# Patient Record
Sex: FEMALE | Race: WHITE | Employment: FULL TIME | URBAN - METROPOLITAN AREA
[De-identification: names, ages, dates, MRNs, and addresses within clinical notes are randomized per-mention and may not be internally consistent; named-entity substitution may affect disease eponyms.]

---

## 2019-02-27 ENCOUNTER — OFFICE VISIT (OUTPATIENT)
Dept: FAMILY MEDICINE CLINIC | Age: 49
End: 2019-02-27
Payer: COMMERCIAL

## 2019-02-27 VITALS
HEIGHT: 61 IN | OXYGEN SATURATION: 99 % | BODY MASS INDEX: 28.32 KG/M2 | WEIGHT: 150 LBS | TEMPERATURE: 97 F | HEART RATE: 82 BPM | RESPIRATION RATE: 16 BRPM | SYSTOLIC BLOOD PRESSURE: 118 MMHG | DIASTOLIC BLOOD PRESSURE: 78 MMHG

## 2019-02-27 DIAGNOSIS — R68.81 EARLY SATIETY: ICD-10-CM

## 2019-02-27 DIAGNOSIS — Z12.31 SCREENING MAMMOGRAM, ENCOUNTER FOR: ICD-10-CM

## 2019-02-27 DIAGNOSIS — R14.0 ABDOMINAL BLOATING: ICD-10-CM

## 2019-02-27 DIAGNOSIS — K58.2 IRRITABLE BOWEL SYNDROME WITH BOTH CONSTIPATION AND DIARRHEA: ICD-10-CM

## 2019-02-27 DIAGNOSIS — K21.00 GASTROESOPHAGEAL REFLUX DISEASE WITH ESOPHAGITIS: Primary | ICD-10-CM

## 2019-02-27 DIAGNOSIS — Z13.31 SCREENING FOR DEPRESSION: ICD-10-CM

## 2019-02-27 DIAGNOSIS — K59.09 OTHER CONSTIPATION: ICD-10-CM

## 2019-02-27 PROCEDURE — G0444 DEPRESSION SCREEN ANNUAL: HCPCS | Performed by: NURSE PRACTITIONER

## 2019-02-27 PROCEDURE — 99214 OFFICE O/P EST MOD 30 MIN: CPT | Performed by: NURSE PRACTITIONER

## 2019-02-27 RX ORDER — OMEPRAZOLE 40 MG/1
40 CAPSULE, DELAYED RELEASE ORAL DAILY
Qty: 30 CAPSULE | Refills: 1 | Status: SHIPPED | OUTPATIENT
Start: 2019-02-27 | End: 2019-05-03 | Stop reason: SDUPTHER

## 2019-02-27 ASSESSMENT — PATIENT HEALTH QUESTIONNAIRE - PHQ9
2. FEELING DOWN, DEPRESSED OR HOPELESS: 0
SUM OF ALL RESPONSES TO PHQ QUESTIONS 1-9: 0
SUM OF ALL RESPONSES TO PHQ9 QUESTIONS 1 & 2: 0
1. LITTLE INTEREST OR PLEASURE IN DOING THINGS: 0
SUM OF ALL RESPONSES TO PHQ QUESTIONS 1-9: 0

## 2019-02-27 ASSESSMENT — ENCOUNTER SYMPTOMS
COLOR CHANGE: 0
TROUBLE SWALLOWING: 0
SORE THROAT: 0
COUGH: 0
CONSTIPATION: 1
VOMITING: 0
ABDOMINAL DISTENTION: 1
RHINORRHEA: 0
SHORTNESS OF BREATH: 0
BACK PAIN: 0
ABDOMINAL PAIN: 0
WHEEZING: 0
SINUS PRESSURE: 0
CHEST TIGHTNESS: 0
RECTAL PAIN: 0
BLOOD IN STOOL: 0
ANAL BLEEDING: 0
FACIAL SWELLING: 0
SINUS PAIN: 0
DIARRHEA: 0
VOICE CHANGE: 0
NAUSEA: 0

## 2019-03-02 ENCOUNTER — HOSPITAL ENCOUNTER (OUTPATIENT)
Age: 49
Discharge: HOME | End: 2019-03-02
Payer: COMMERCIAL

## 2019-03-02 DIAGNOSIS — K58.2 IRRITABLE BOWEL SYNDROME WITH BOTH CONSTIPATION AND DIARRHEA: ICD-10-CM

## 2019-03-02 DIAGNOSIS — K59.09 OTHER CONSTIPATION: ICD-10-CM

## 2019-03-02 DIAGNOSIS — K21.00 GASTROESOPHAGEAL REFLUX DISEASE WITH ESOPHAGITIS: ICD-10-CM

## 2019-03-02 LAB
ALBUMIN SERPL-MCNC: 4.2 G/DL (ref 3.5–5.2)
ALP BLD-CCNC: 64 U/L (ref 35–104)
ALT SERPL-CCNC: 12 U/L (ref 0–32)
ANION GAP SERPL CALCULATED.3IONS-SCNC: 11 MMOL/L (ref 7–16)
AST SERPL-CCNC: 17 U/L (ref 0–31)
BASOPHILS ABSOLUTE: 0.03 E9/L (ref 0–0.2)
BASOPHILS RELATIVE PERCENT: 0.5 % (ref 0–2)
BILIRUB SERPL-MCNC: 0.4 MG/DL (ref 0–1.2)
BUN BLDV-MCNC: 15 MG/DL (ref 6–20)
CALCIUM SERPL-MCNC: 8.9 MG/DL (ref 8.6–10.2)
CHLORIDE BLD-SCNC: 105 MMOL/L (ref 98–107)
CO2: 26 MMOL/L (ref 22–29)
CREAT SERPL-MCNC: 0.7 MG/DL (ref 0.5–1)
EOSINOPHILS ABSOLUTE: 0.2 E9/L (ref 0.05–0.5)
EOSINOPHILS RELATIVE PERCENT: 3.5 % (ref 0–6)
GFR AFRICAN AMERICAN: >60
GFR NON-AFRICAN AMERICAN: >60 ML/MIN/1.73
GLUCOSE BLD-MCNC: 89 MG/DL (ref 74–99)
HCT VFR BLD CALC: 39.1 % (ref 34–48)
HEMOGLOBIN: 12.5 G/DL (ref 11.5–15.5)
IMMATURE GRANULOCYTES #: 0.01 E9/L
IMMATURE GRANULOCYTES %: 0.2 % (ref 0–5)
LYMPHOCYTES ABSOLUTE: 2.49 E9/L (ref 1.5–4)
LYMPHOCYTES RELATIVE PERCENT: 43.8 % (ref 20–42)
MCH RBC QN AUTO: 30.1 PG (ref 26–35)
MCHC RBC AUTO-ENTMCNC: 32 % (ref 32–34.5)
MCV RBC AUTO: 94.2 FL (ref 80–99.9)
MONOCYTES ABSOLUTE: 0.36 E9/L (ref 0.1–0.95)
MONOCYTES RELATIVE PERCENT: 6.3 % (ref 2–12)
NEUTROPHILS ABSOLUTE: 2.59 E9/L (ref 1.8–7.3)
NEUTROPHILS RELATIVE PERCENT: 45.7 % (ref 43–80)
PDW BLD-RTO: 13.4 FL (ref 11.5–15)
PLATELET # BLD: 332 E9/L (ref 130–450)
PMV BLD AUTO: 11 FL (ref 7–12)
POTASSIUM SERPL-SCNC: 4.1 MMOL/L (ref 3.5–5)
RBC # BLD: 4.15 E12/L (ref 3.5–5.5)
SODIUM BLD-SCNC: 142 MMOL/L (ref 132–146)
T4 FREE: 1.01 NG/DL (ref 0.93–1.7)
TOTAL PROTEIN: 7.2 G/DL (ref 6.4–8.3)
TSH SERPL DL<=0.05 MIU/L-ACNC: 1.34 UIU/ML (ref 0.27–4.2)
WBC # BLD: 5.7 E9/L (ref 4.5–11.5)

## 2019-03-02 PROCEDURE — 84439 ASSAY OF FREE THYROXINE: CPT

## 2019-03-02 PROCEDURE — 85025 COMPLETE CBC W/AUTO DIFF WBC: CPT

## 2019-03-02 PROCEDURE — 80053 COMPREHEN METABOLIC PANEL: CPT

## 2019-03-02 PROCEDURE — 84443 ASSAY THYROID STIM HORMONE: CPT

## 2019-03-25 ENCOUNTER — OFFICE VISIT (OUTPATIENT)
Dept: FAMILY MEDICINE CLINIC | Age: 49
End: 2019-03-25
Payer: COMMERCIAL

## 2019-03-25 VITALS
BODY MASS INDEX: 28.15 KG/M2 | DIASTOLIC BLOOD PRESSURE: 80 MMHG | OXYGEN SATURATION: 98 % | RESPIRATION RATE: 16 BRPM | WEIGHT: 149.12 LBS | SYSTOLIC BLOOD PRESSURE: 112 MMHG | HEIGHT: 61 IN | TEMPERATURE: 97.8 F | HEART RATE: 62 BPM

## 2019-03-25 DIAGNOSIS — Z83.438 FH: HYPERLIPIDEMIA: ICD-10-CM

## 2019-03-25 DIAGNOSIS — K21.00 GASTROESOPHAGEAL REFLUX DISEASE WITH ESOPHAGITIS: Primary | ICD-10-CM

## 2019-03-25 PROCEDURE — 99213 OFFICE O/P EST LOW 20 MIN: CPT | Performed by: NURSE PRACTITIONER

## 2019-03-25 ASSESSMENT — ENCOUNTER SYMPTOMS
SINUS PRESSURE: 0
SHORTNESS OF BREATH: 0
ABDOMINAL PAIN: 0
BACK PAIN: 0
VOICE CHANGE: 0
RHINORRHEA: 0
ABDOMINAL DISTENTION: 1
CHEST TIGHTNESS: 0
VOMITING: 0
DIARRHEA: 0
COUGH: 0
WHEEZING: 0
TROUBLE SWALLOWING: 0
SINUS PAIN: 0
COLOR CHANGE: 0
SORE THROAT: 0
CONSTIPATION: 0
NAUSEA: 0
FACIAL SWELLING: 0

## 2019-04-06 ENCOUNTER — HOSPITAL ENCOUNTER (OUTPATIENT)
Age: 49
Discharge: HOME | End: 2019-04-08
Payer: COMMERCIAL

## 2019-04-06 DIAGNOSIS — Z83.438 FH: HYPERLIPIDEMIA: ICD-10-CM

## 2019-04-06 LAB
CHOLESTEROL, TOTAL: 195 MG/DL (ref 0–199)
HDLC SERPL-MCNC: 61 MG/DL
LDL CHOLESTEROL CALCULATED: 120 MG/DL (ref 0–99)
TRIGL SERPL-MCNC: 68 MG/DL (ref 0–149)
VLDLC SERPL CALC-MCNC: 14 MG/DL

## 2019-04-06 PROCEDURE — 36415 COLL VENOUS BLD VENIPUNCTURE: CPT

## 2019-04-06 PROCEDURE — 80061 LIPID PANEL: CPT

## 2019-04-08 ENCOUNTER — TELEPHONE (OUTPATIENT)
Dept: FAMILY MEDICINE CLINIC | Age: 49
End: 2019-04-08

## 2019-05-02 DIAGNOSIS — R14.0 ABDOMINAL BLOATING: ICD-10-CM

## 2019-05-02 DIAGNOSIS — K21.00 GASTROESOPHAGEAL REFLUX DISEASE WITH ESOPHAGITIS: ICD-10-CM

## 2019-05-03 RX ORDER — OMEPRAZOLE 40 MG/1
40 CAPSULE, DELAYED RELEASE ORAL DAILY
Qty: 30 CAPSULE | Refills: 3 | Status: SHIPPED | OUTPATIENT
Start: 2019-05-03 | End: 2019-09-12 | Stop reason: ALTCHOICE

## 2019-06-12 ENCOUNTER — TELEPHONE (OUTPATIENT)
Dept: FAMILY MEDICINE CLINIC | Age: 49
End: 2019-06-12

## 2019-07-01 ENCOUNTER — OFFICE VISIT (OUTPATIENT)
Dept: FAMILY MEDICINE CLINIC | Age: 49
End: 2019-07-01
Payer: COMMERCIAL

## 2019-07-01 VITALS
TEMPERATURE: 98.2 F | WEIGHT: 149.5 LBS | HEIGHT: 61 IN | OXYGEN SATURATION: 99 % | RESPIRATION RATE: 17 BRPM | BODY MASS INDEX: 28.22 KG/M2 | HEART RATE: 74 BPM | DIASTOLIC BLOOD PRESSURE: 68 MMHG | SYSTOLIC BLOOD PRESSURE: 102 MMHG

## 2019-07-01 DIAGNOSIS — Z82.49 FAMILY HISTORY OF EARLY CAD: ICD-10-CM

## 2019-07-01 DIAGNOSIS — A04.8 BACTERIAL INFECTION DUE TO H. PYLORI: Primary | ICD-10-CM

## 2019-07-01 PROBLEM — R68.81 EARLY SATIETY: Status: RESOLVED | Noted: 2019-02-27 | Resolved: 2019-07-01

## 2019-07-01 PROBLEM — R14.0 ABDOMINAL BLOATING: Status: RESOLVED | Noted: 2019-02-27 | Resolved: 2019-07-01

## 2019-07-01 PROCEDURE — 99214 OFFICE O/P EST MOD 30 MIN: CPT | Performed by: NURSE PRACTITIONER

## 2019-07-01 RX ORDER — ROSUVASTATIN CALCIUM 10 MG/1
10 TABLET, COATED ORAL NIGHTLY
Qty: 30 TABLET | Refills: 2 | Status: SHIPPED | OUTPATIENT
Start: 2019-07-01 | End: 2019-09-30

## 2019-07-01 RX ORDER — AMOXICILLIN 500 MG/1
1000 CAPSULE ORAL 2 TIMES DAILY
Qty: 40 CAPSULE | Refills: 0 | Status: SHIPPED | OUTPATIENT
Start: 2019-07-01 | End: 2019-07-11

## 2019-07-01 RX ORDER — CLARITHROMYCIN 500 MG/1
500 TABLET, COATED ORAL 2 TIMES DAILY
Qty: 20 TABLET | Refills: 0 | Status: SHIPPED | OUTPATIENT
Start: 2019-07-01 | End: 2019-07-11

## 2019-07-01 RX ORDER — OMEPRAZOLE 20 MG/1
20 CAPSULE, DELAYED RELEASE ORAL
Qty: 20 CAPSULE | Refills: 0 | Status: SHIPPED | OUTPATIENT
Start: 2019-07-01 | End: 2019-09-12 | Stop reason: SDUPTHER

## 2019-07-01 ASSESSMENT — ENCOUNTER SYMPTOMS
SINUS PAIN: 0
DIARRHEA: 0
COLOR CHANGE: 0
ABDOMINAL PAIN: 0
SHORTNESS OF BREATH: 0
FACIAL SWELLING: 0
WHEEZING: 0
VOICE CHANGE: 0
RHINORRHEA: 0
SORE THROAT: 0
TROUBLE SWALLOWING: 0
SINUS PRESSURE: 0
CONSTIPATION: 0
BACK PAIN: 0
VOMITING: 0
COUGH: 0
NAUSEA: 0
CHEST TIGHTNESS: 0

## 2019-07-01 NOTE — PATIENT INSTRUCTIONS
worse.  · Wash your hands after going to the bathroom. · Avoid aspirin, ibuprofen, or other anti-inflammatory medicines, because they can irritate the stomach. If you need pain medicine, try acetaminophen (Tylenol). When should you call for help? Call 911 anytime you think you may need emergency care. For example, call if:    · You vomit blood or what looks like coffee grounds.     · Your stools are maroon or very bloody.    Call your doctor now or seek immediate medical care if:    · You have new or worse belly pain.     · You are vomiting.     · Your stools are black and look like tar, or they have streaks of blood.    Watch closely for changes in your health, and be sure to contact your doctor if:    · You do not get better as expected. Where can you learn more? Go to [email or web address]://[email or web address] [email or web address].com. org and sign in to your [employer] account. Enter B945 in the [employer] box to learn more about \"H. Pylori Bacterial Infection: Care Instructions. \"     If you do not have an account, please click on the \"Sign Up Now\" link. Current as of: November 7, 2018  Content Version: 12.0  © 4207-3533 [employer]. Care instructions adapted under license by [facility] ([facility]). If you have questions about a medical condition or this instruction, always ask your healthcare professional. [address] any warranty or liability for your use of this information. Patient Education        amoxicillin, clarithromycin, and omeprazole  Pronunciation:  a MOX con MIRANDA incarl MYE sin, oh [facility] ra zole  Brand:  Omeclamox-Raciel  What is the most important information I should know about amoxicillin, clarithromycin, and omeprazole? You should not take this medication if you are allergic to amoxicillin (Amoxil, Augmentin, Moxatag), clarithromycin (Biaxin), omeprazole (Prilosec), or if you have liver disease, or a history of allergic reaction to any antibiotic.   Before you take this your use of this information. Patient Education        rosuvastatin  Pronunciation:  laron TOMASA va sta tin  Brand:  Crestor  What is the most important information I should know about rosuvastatin? Do not use rosuvastatin if you are pregnant. It could harm the unborn baby or cause birth defects. You should not take rosuvastatin if you have liver disease, or if you breast-feeding a baby. What is rosuvastatin? Rosuvastatin is in a group of drugs called HMG CoA reductase inhibitors, or \"statins. \" Rosuvastatin reduces levels of \"bad\" cholesterol (low-density lipoprotein, or LDL) and triglycerides in the blood, while increasing levels of \"good\" cholesterol (high-density lipoprotein, or HDL). Rosuvastatin is used in adults and children who are at least 6years old, to lower cholesterol and triglycerides (types of fat) in the blood and to slow the build-up of plaque (fatty deposits) in blood vessels. Rosuvastatin is also used to lower the risk of stroke, heart attack, and other heart complications in certain people with diabetes, coronary heart disease, or other risk factors. Rosuvastatin is also used to treat hereditary forms of high cholesterol, including the heterozygous type (inherited from one parent) and the homozygous type (inherited from both parents). For the heterozygous type, rosuvastatin can be used in children who are at least 6years old. For the homozygous type, rosuvastatin can be used in children as young as 9years old. Rosuvastatin may also be used for purposes not listed in this medication guide. What should I discuss with my healthcare provider before taking rosuvastatin? You should not take rosuvastatin if you are allergic to it, or if you have:  · liver disease; or  · if you are pregnant or breast-feeding. Do not take rosuvastatin if you are pregnant. It could harm the unborn baby or cause birth defects. Stop taking rosuvastatin and tell your doctor right away if you become pregnant. key part of your treatment and safety. Be sure to make and go to all appointments, and call your doctor if you are having problems. It's also a good idea to know your test results and keep a list of the medicines you take. Where can you learn more? Go to https://chpepiceweb.Zeenoh. org and sign in to your [employer] account. Enter I285 in the [facility] box to learn more about \"Learning About High Cholesterol. \"     If you do not have an account, please click on the \"Sign Up Now\" link. Current as of: July 22, 2018  Content Version: 12.0  © 3574-5991 [employer]. Care instructions adapted under license by [employer] ([facility]). If you have questions about a medical condition or this instruction, always ask your healthcare professional. [address] any warranty or liability for your use of this information. Patient Education         Statins: Should You Take Them to Lower Your Risk? (03:16)  Your health professional recommends that you watch this short online health video. Compare the pros and cons of taking a statin to lower your risk for heart attack and stroke. How to watch the video    Scan the QR code   OR Visit the website    [email or web address]/r/[email or web address]   Current as of: July 22, 2018  Content Version: 12.0  © 2006-2019 [employer]. Care instructions adapted under license by [employer] ([facility]). If you have questions about a medical condition or this instruction, always ask your healthcare professional. [address] any warranty or liability for your use of this information. Patient Education         Heart Health: Where Will You Be in 5 Years? (03:53)  Your health professional recommends that you watch this short online health video. Hear what motivated other people to make changes to keep their heart healthy. How to watch the video    Scan the QR code   OR Visit the website    [email or web address] Current as of: July 22, 2018  Content Version: 12.0  © 7326-4025 [employer]. Care instructions adapted under license by [employer] ([facility]). If you have questions about a medical condition or this instruction, always ask your healthcare professional. [address] any warranty or liability for your use of this information. Patient Education         Heart Health: Finding Support for Healthy Changes (02:14)  Your health professional recommends that you watch this short online health video. Hear how one woman found the support she needed to make heart-healthy choices. How to watch the video    Scan the QR code   OR Visit the website    [email or web address] MyWobile/[email or web address]   Current as of: July 22, 2018  Content Version: 12.0  © 2006-2019 [employer]. Care instructions adapted under license by [employer] ([facility]). If you have questions about a medical condition or this instruction, always ask your healthcare professional. [address] any warranty or liability for your use of this information. Patient Education         Cholesterol: How It Raises Your Risk (01:53)  Your health professional recommends that you watch this short online health video. Learn how high cholesterol raises your risk for heart attack and stroke. How to watch the video    Scan the QR code   OR Visit the website    [email or web address] MyWobile/[email or web address]   Current as of: July 22, 2018  Content Version: 12.0  © 2006-2019 [employer]. Care instructions adapted under license by [employer] ([facility]). If you have questions about a medical condition or this instruction, always ask your healthcare professional. [address] any warranty or liability for your use of this information. Patient Education         Cholesterol: Choosing a Heart-Healthy Life (01:57)  Your health professional recommends that you watch this short online health video.   Learn about making

## 2019-07-01 NOTE — PROGRESS NOTES
OFFICE PROGRESS NOTE  [address] [address]  Dept: [phone]   Chief Complaint   Patient presents with    3 Month Follow-Up     review procedure with Dr. uStton Cea Gastroesophageal Reflux    Hyperlipidemia         HPI:       She had endoscopy done with DR Lara and was found to have 2 cm sliding hiatal hernia and + H. Pylori but she didn't go back for the follow up so wasn't treated. GERD has been doing well. Hyperlipidemia: Patient presents with hyperlipidemia. She was tested because FH early CAD, dad had MI age 40 and brother passed away age 43, had 3 vessel CABG. Her last labs showed Total cholesterol of 195, HDL 61, ,  Triglycerides 68. Denies any chest pain, dyspnea, exertional chest pressure/discomfort, fatigue, feeding intolerance, lower extremity edema, palpitations, poor exercise tolerance, syncope, tachypnea and skin xanthelasma. There is a family history of hyperlipidemia. There is a family history of early ischemia heart disease.   Lab Results   Component Value Date    CHOL 195 04/06/2019    CHOL 199 11/17/2016     Lab Results   Component Value Date    TRIG 68 04/06/2019    TRIG 105 11/17/2016     Lab Results   Component Value Date    HDL 61 04/06/2019    HDL 60 11/17/2016     Lab Results   Component Value Date    LDLCALC 120 (H) 04/06/2019    LDLCALC 118 (H) 11/17/2016     Lab Results   Component Value Date    LABVLDL 14 04/06/2019    LABVLDL 21 11/17/2016           Current Outpatient Medications:     clarithromycin (BIAXIN) 500 MG tablet, Take 1 tablet by mouth 2 times daily for 10 days, Disp: 20 tablet, Rfl: 0    amoxicillin (AMOXIL) 500 MG capsule, Take 2 capsules by mouth 2 times daily for 10 days, Disp: 40 capsule, Rfl: 0    omeprazole (PRILOSEC) 20 MG delayed release capsule, Take 1 capsule by mouth 2 times daily (before meals), Disp: 20 capsule, Rfl: 0    rosuvastatin (CRESTOR) 10 MG tablet, Take 1 tablet by mouth nightly One by mouth every night for cholesterol, Disp: 30 tablet, Rfl: 2    omeprazole (PRILOSEC) 40 MG delayed release capsule, Take 1 capsule by mouth daily, Disp: 30 capsule, Rfl: 3  Social History     Socioeconomic History    Marital status:      Spouse name: None    Number of children: None    Years of education: None    Highest education level: None   Occupational History    None   Social Needs    Financial resource strain: None    Food insecurity:     Worry: None     Inability: None    Transportation needs:     Medical: None     Non-medical: None   Tobacco Use    Smoking status: Never Smoker    Smokeless tobacco: Never Used   Substance and Sexual Activity    Alcohol use: No    Drug use: No    Sexual activity: Never   Lifestyle    Physical activity:     Days per week: None     Minutes per session: None    Stress: None   Relationships    Social connections:     Talks on phone: None     Gets together: None     Attends Gnosticist service: None     Active member of club or organization: None     Attends meetings of clubs or organizations: None     Relationship status: None    Intimate partner violence:     Fear of current or ex partner: None     Emotionally abused: None     Physically abused: None     Forced sexual activity: None   Other Topics Concern    None   Social History Narrative    None       I have reviewed Radha's allergies, medications, problem list, medical, social and family history and have updated as needed in the electronic medical record    Review of Systems   Constitutional: Negative for activity change, appetite change, chills, diaphoresis, fatigue, fever and unexpected weight change. HENT: Negative for congestion, dental problem, drooling, ear discharge, ear pain, facial swelling, hearing loss, mouth sores, nosebleeds, postnasal drip, rhinorrhea, sinus pressure, sinus pain, sneezing, sore throat, tinnitus, trouble swallowing and voice change. Eyes: Negative for visual disturbance. Respiratory: Negative for cough, chest tightness, shortness of breath and wheezing. Cardiovascular: Negative for chest pain, palpitations and leg swelling. Gastrointestinal: Negative for abdominal pain, constipation, diarrhea, nausea and vomiting. Endocrine: Negative for cold intolerance, heat intolerance, polydipsia, polyphagia and polyuria. Genitourinary: Negative for difficulty urinating, frequency and urgency. Musculoskeletal: Negative for arthralgias, back pain, gait problem, joint swelling, myalgias, neck pain and neck stiffness. Skin: Negative for color change, pallor, rash and wound. Allergic/Immunologic: Negative for environmental allergies, food allergies and immunocompromised state. Neurological: Negative for dizziness, tremors, seizures, syncope, facial asymmetry, speech difficulty, weakness, light-headedness, numbness and headaches. Hematological: Negative for adenopathy. Does not bruise/bleed easily. Psychiatric/Behavioral: Negative for agitation, behavioral problems, confusion, decreased concentration, dysphoric mood, hallucinations, self-injury, sleep disturbance and suicidal ideas. The patient is not nervous/anxious and is not hyperactive.         OBJECTIVE:     VS:  Wt Readings from Last 3 Encounters:   07/01/19 149 lb 8 oz (67.8 kg)   03/25/19 149 lb 1.9 oz (67.6 kg)   02/27/19 150 lb (68 kg)                       Vitals:    07/01/19 1311   BP: 102/68   Pulse: 74   Resp: 17   Temp: 98.2 °F (36.8 °C)   TempSrc: Temporal   SpO2: 99%   Weight: 149 lb 8 oz (67.8 kg)   Height: 5' 1\" (1.549 m)       General: Alert and oriented to person, place, and time, well developed and well nourished, in no acute distress  SKIN: Warm and dry, intact without any rash, masses or lesions  HEAD: normocephalic, atraumatic  Eyes: sclera/conjunctiva clear, PERRLA, EOMI's intact  ENT: tympanic membranes, external ear and ear canal normal bilaterally, normal hearing, Nose without deformity, nasal mucosa and turbinates normal without polyps   Throat: clear, tongue midline, tonsils 1+, drainage, no masses or lesions noted, good dentition  Neck: supple and non-tender without mass, trachea midline, no cervical lymphadenopathy, no bruit, no thyromegaly or nodules  Cardiovascular: regular rate and regular rhythm, normal S1 and S2,  no murmurs, rubs, clicks, or gallop. Distal pulses intact, no carotid bruits. No edema  Pulmonary/Chest: clear to auscultation bilaterally, no wheezes, rales or rhonchi, normal air movement, no respiratory distress  Abdomen: soft, non-tender, non-distended, normal bowel sounds, no masses or hepatosplenomegaly  Musculoskeletal: Normal ROM, no joint swelling, deformity or tenderness   Neurologic: reflexes normal and symmetric, no cranial nerve deficit, gait, coordination and speech normal  Extremities: no clubbing, cyanosis, or edema. Psychiatric: Good eye contact, normal mood and affect, answers questions appropriately    ASSESSMENT/PLAN   Radha was seen today for 3 month follow-up, gastroesophageal reflux and hyperlipidemia. Diagnoses and all orders for this visit:    Bacterial infection due to H. pylori  -     clarithromycin (BIAXIN) 500 MG tablet; Take 1 tablet by mouth 2 times daily for 10 days  -     amoxicillin (AMOXIL) 500 MG capsule; Take 2 capsules by mouth 2 times daily for 10 days  -     omeprazole (PRILOSEC) 20 MG delayed release capsule; Take 1 capsule by mouth 2 times daily (before meals)    Family history of early CAD  -     rosuvastatin (CRESTOR) 10 MG tablet; Take 1 tablet by mouth nightly One by mouth every night for cholesterol  -     Lipid Panel; Future            Return in about 2 months (around 9/1/2019), or if symptoms worsen or fail to improve, for fasting labs, hyperlipidemia.      Discussed weight loss, Discussed exercising 30 minutes daily and Discussed taking medications as directed and adverse effects        I have reviewed my findings and recommendations with Radha Salter.     Abeba Hurt, NP-C, FNP-BC

## 2019-07-10 ENCOUNTER — TELEPHONE (OUTPATIENT)
Dept: FAMILY MEDICINE CLINIC | Age: 49
End: 2019-07-10

## 2019-07-10 DIAGNOSIS — B37.31 VAGINAL CANDIDA: Primary | ICD-10-CM

## 2019-07-10 RX ORDER — FLUCONAZOLE 150 MG/1
150 TABLET ORAL ONCE
Qty: 1 TABLET | Refills: 0 | Status: SHIPPED | OUTPATIENT
Start: 2019-07-10 | End: 2019-07-10

## 2019-09-04 ENCOUNTER — TELEPHONE (OUTPATIENT)
Dept: FAMILY MEDICINE CLINIC | Age: 49
End: 2019-09-04

## 2019-09-12 DIAGNOSIS — A04.8 BACTERIAL INFECTION DUE TO H. PYLORI: ICD-10-CM

## 2019-09-12 RX ORDER — OMEPRAZOLE 20 MG/1
20 CAPSULE, DELAYED RELEASE ORAL DAILY
Qty: 30 CAPSULE | Refills: 6 | Status: SHIPPED
Start: 2019-09-12 | End: 2020-04-21 | Stop reason: SDUPTHER

## 2019-09-30 ENCOUNTER — OFFICE VISIT (OUTPATIENT)
Dept: FAMILY MEDICINE CLINIC | Age: 49
End: 2019-09-30
Payer: COMMERCIAL

## 2019-09-30 VITALS
BODY MASS INDEX: 28.62 KG/M2 | HEIGHT: 61 IN | HEART RATE: 83 BPM | WEIGHT: 151.6 LBS | RESPIRATION RATE: 18 BRPM | SYSTOLIC BLOOD PRESSURE: 120 MMHG | OXYGEN SATURATION: 99 % | DIASTOLIC BLOOD PRESSURE: 76 MMHG | TEMPERATURE: 97.4 F

## 2019-09-30 DIAGNOSIS — Z83.438 FH: HYPERLIPIDEMIA: ICD-10-CM

## 2019-09-30 DIAGNOSIS — Z82.49 FAMILY HISTORY OF EARLY CAD: Primary | ICD-10-CM

## 2019-09-30 PROBLEM — K59.09 OTHER CONSTIPATION: Status: RESOLVED | Noted: 2019-02-27 | Resolved: 2019-09-30

## 2019-09-30 PROCEDURE — 99213 OFFICE O/P EST LOW 20 MIN: CPT | Performed by: NURSE PRACTITIONER

## 2019-09-30 ASSESSMENT — ENCOUNTER SYMPTOMS
VOICE CHANGE: 0
ABDOMINAL PAIN: 0
NAUSEA: 0
DIARRHEA: 0
VOMITING: 0
SHORTNESS OF BREATH: 0
FACIAL SWELLING: 0
TROUBLE SWALLOWING: 0
COUGH: 0
CHEST TIGHTNESS: 0
SORE THROAT: 0
SINUS PRESSURE: 0
WHEEZING: 0
SINUS PAIN: 0
CONSTIPATION: 0
RHINORRHEA: 0
COLOR CHANGE: 0
BACK PAIN: 0

## 2020-04-21 RX ORDER — OMEPRAZOLE 20 MG/1
20 CAPSULE, DELAYED RELEASE ORAL DAILY
Qty: 30 CAPSULE | Refills: 6 | Status: CANCELLED | OUTPATIENT
Start: 2020-04-21

## 2020-04-21 RX ORDER — OMEPRAZOLE 20 MG/1
20 CAPSULE, DELAYED RELEASE ORAL DAILY
Qty: 30 CAPSULE | Refills: 6 | Status: SHIPPED
Start: 2020-04-21 | End: 2020-08-10 | Stop reason: SDUPTHER

## 2020-08-10 ENCOUNTER — HOSPITAL ENCOUNTER (OUTPATIENT)
Age: 50
Discharge: HOME | End: 2020-08-12
Payer: COMMERCIAL

## 2020-08-10 ENCOUNTER — OFFICE VISIT (OUTPATIENT)
Dept: FAMILY MEDICINE CLINIC | Age: 50
End: 2020-08-10
Payer: COMMERCIAL

## 2020-08-10 VITALS
HEIGHT: 61 IN | TEMPERATURE: 98.4 F | HEART RATE: 74 BPM | OXYGEN SATURATION: 99 % | WEIGHT: 153 LBS | BODY MASS INDEX: 28.89 KG/M2 | RESPIRATION RATE: 18 BRPM | DIASTOLIC BLOOD PRESSURE: 76 MMHG | SYSTOLIC BLOOD PRESSURE: 118 MMHG

## 2020-08-10 PROBLEM — G89.29 CHRONIC CERVICAL PAIN: Status: ACTIVE | Noted: 2020-08-10

## 2020-08-10 PROBLEM — N62 MACROMASTIA: Status: ACTIVE | Noted: 2020-08-10

## 2020-08-10 PROBLEM — M54.2 CHRONIC CERVICAL PAIN: Status: ACTIVE | Noted: 2020-08-10

## 2020-08-10 LAB
ALBUMIN SERPL-MCNC: 4.1 G/DL (ref 3.5–5.2)
ALP BLD-CCNC: 69 U/L (ref 35–104)
ALT SERPL-CCNC: 12 U/L (ref 0–32)
ANION GAP SERPL CALCULATED.3IONS-SCNC: 11 MMOL/L (ref 7–16)
AST SERPL-CCNC: 20 U/L (ref 0–31)
BASOPHILS ABSOLUTE: 0.03 E9/L (ref 0–0.2)
BASOPHILS RELATIVE PERCENT: 0.4 % (ref 0–2)
BILIRUB SERPL-MCNC: 0.3 MG/DL (ref 0–1.2)
BUN BLDV-MCNC: 16 MG/DL (ref 6–20)
CALCIUM SERPL-MCNC: 9.3 MG/DL (ref 8.6–10.2)
CHLORIDE BLD-SCNC: 105 MMOL/L (ref 98–107)
CHOLESTEROL, TOTAL: 174 MG/DL (ref 0–199)
CO2: 22 MMOL/L (ref 22–29)
CREAT SERPL-MCNC: 0.8 MG/DL (ref 0.5–1)
EOSINOPHILS ABSOLUTE: 0.18 E9/L (ref 0.05–0.5)
EOSINOPHILS RELATIVE PERCENT: 2.4 % (ref 0–6)
GFR AFRICAN AMERICAN: >60
GFR NON-AFRICAN AMERICAN: >60 ML/MIN/1.73
GLUCOSE BLD-MCNC: 87 MG/DL (ref 74–99)
HBA1C MFR BLD: 5.8 % (ref 4–5.6)
HCT VFR BLD CALC: 39.5 % (ref 34–48)
HDLC SERPL-MCNC: 50 MG/DL
HEMOGLOBIN: 12.2 G/DL (ref 11.5–15.5)
IMMATURE GRANULOCYTES #: 0.02 E9/L
IMMATURE GRANULOCYTES %: 0.3 % (ref 0–5)
LDL CHOLESTEROL CALCULATED: 105 MG/DL (ref 0–99)
LYMPHOCYTES ABSOLUTE: 3.19 E9/L (ref 1.5–4)
LYMPHOCYTES RELATIVE PERCENT: 42.2 % (ref 20–42)
MCH RBC QN AUTO: 28.4 PG (ref 26–35)
MCHC RBC AUTO-ENTMCNC: 30.9 % (ref 32–34.5)
MCV RBC AUTO: 91.9 FL (ref 80–99.9)
MONOCYTES ABSOLUTE: 0.51 E9/L (ref 0.1–0.95)
MONOCYTES RELATIVE PERCENT: 6.7 % (ref 2–12)
NEUTROPHILS ABSOLUTE: 3.63 E9/L (ref 1.8–7.3)
NEUTROPHILS RELATIVE PERCENT: 48 % (ref 43–80)
PDW BLD-RTO: 14.6 FL (ref 11.5–15)
PLATELET # BLD: 342 E9/L (ref 130–450)
PMV BLD AUTO: 10.8 FL (ref 7–12)
POTASSIUM SERPL-SCNC: 4.4 MMOL/L (ref 3.5–5)
RBC # BLD: 4.3 E12/L (ref 3.5–5.5)
SODIUM BLD-SCNC: 138 MMOL/L (ref 132–146)
T4 FREE: 0.99 NG/DL (ref 0.93–1.7)
TOTAL PROTEIN: 7.3 G/DL (ref 6.4–8.3)
TRIGL SERPL-MCNC: 96 MG/DL (ref 0–149)
TSH SERPL DL<=0.05 MIU/L-ACNC: 1.82 UIU/ML (ref 0.27–4.2)
VLDLC SERPL CALC-MCNC: 19 MG/DL
WBC # BLD: 7.6 E9/L (ref 4.5–11.5)

## 2020-08-10 PROCEDURE — 83036 HEMOGLOBIN GLYCOSYLATED A1C: CPT

## 2020-08-10 PROCEDURE — 80061 LIPID PANEL: CPT

## 2020-08-10 PROCEDURE — 86703 HIV-1/HIV-2 1 RESULT ANTBDY: CPT

## 2020-08-10 PROCEDURE — 80053 COMPREHEN METABOLIC PANEL: CPT

## 2020-08-10 PROCEDURE — 84443 ASSAY THYROID STIM HORMONE: CPT

## 2020-08-10 PROCEDURE — 99214 OFFICE O/P EST MOD 30 MIN: CPT | Performed by: NURSE PRACTITIONER

## 2020-08-10 PROCEDURE — 36415 COLL VENOUS BLD VENIPUNCTURE: CPT

## 2020-08-10 PROCEDURE — 84439 ASSAY OF FREE THYROXINE: CPT

## 2020-08-10 PROCEDURE — 85025 COMPLETE CBC W/AUTO DIFF WBC: CPT

## 2020-08-10 RX ORDER — VALACYCLOVIR HYDROCHLORIDE 500 MG/1
500 TABLET, FILM COATED ORAL DAILY PRN
COMMUNITY
Start: 2020-06-19 | End: 2021-08-16 | Stop reason: SDUPTHER

## 2020-08-10 RX ORDER — OMEPRAZOLE 20 MG/1
20 CAPSULE, DELAYED RELEASE ORAL DAILY
Qty: 30 CAPSULE | Refills: 11 | Status: SHIPPED
Start: 2020-08-10 | End: 2021-08-16 | Stop reason: SDUPTHER

## 2020-08-10 ASSESSMENT — ENCOUNTER SYMPTOMS
ABDOMINAL PAIN: 0
SHORTNESS OF BREATH: 0
SINUS PAIN: 0
VOICE CHANGE: 0
WHEEZING: 0
COUGH: 0
BACK PAIN: 1
CONSTIPATION: 1
RHINORRHEA: 0
COLOR CHANGE: 0
SINUS PRESSURE: 0
FACIAL SWELLING: 0
CHEST TIGHTNESS: 0
TROUBLE SWALLOWING: 0
VOMITING: 0
NAUSEA: 0
DIARRHEA: 1
SORE THROAT: 0

## 2020-08-10 NOTE — PATIENT INSTRUCTIONS
neck pain is getting worse. · You are not getting better after 1 week. · You do not get better as expected. Where can you learn more? Go to https://chpepiceweb.uSpeak. org and sign in to your [employer] account. Enter [id number] in the [facility] box to learn more about \"Neck Pain: Care Instructions. \"     If you do not have an account, please click on the \"Sign Up Now\" link. Current as of: March 2, 2020               Content Version: 12.5  © 3063-9774 Healthwise, Incorporated. Care instructions adapted under license by [facility] ([facility]). If you have questions about a medical condition or this instruction, always ask your healthcare professional. [address] any warranty or liability for your use of this information. Patient Education        Neck: Exercises  Introduction  Here are some examples of exercises for you to try. The exercises may be suggested for a condition or for rehabilitation. Start each exercise slowly. Ease off the exercises if you start to have pain. You will be told when to start these exercises and which ones will work best for you. How to do the exercises  Neck stretch   1. This stretch works best if you keep your shoulder down as you lean away from it. To help you remember to do this, start by relaxing your shoulders and lightly holding on to your thighs or your chair. 2. Tilt your head toward your shoulder and hold for 15 to 30 seconds. Let the weight of your head stretch your muscles. 3. If you would like a little added stretch, use your hand to gently and steadily pull your head toward your shoulder. For example, keeping your right shoulder down, lean your head to the left. 4. Repeat 2 to 4 times toward each shoulder. Diagonal neck stretch   1. Turn your head slightly toward the direction you will be stretching, and tilt your head diagonally toward your chest and hold for 15 to 30 seconds.   2. If you would like a little added stretch, use your hand to gently and steadily pull your head forward on the diagonal.  3. Repeat 2 to 4 times toward each side. Dorsal glide stretch   The dorsal glide stretches the back of the neck. If you feel pain, do not glide so far back. Some people find this exercise easier to do while lying on their backs with an ice pack on the neck. 1. Sit or stand tall and look straight ahead. 2. Slowly tuck your chin as you glide your head backward over your body  3. Hold for a count of 6, and then relax for up to 10 seconds. 4. Repeat 8 to 12 times. Chest and shoulder stretch   1. Sit or stand tall and glide your head backward as in the dorsal glide stretch. 2. Raise both arms so that your hands are next to your ears. 3. Take a deep breath, and as you breathe out, lower your elbows down and behind your back. You will feel your shoulder blades slide down and together, and at the same time you will feel a stretch across your chest and the front of your shoulders. 4. Hold for about 6 seconds, and then relax for up to 10 seconds. 5. Repeat 8 to 12 times. Strengthening: Hands on head   1. Move your head backward, forward, and side to side against gentle pressure from your hands, holding each position for about 6 seconds. 2. Repeat 8 to 12 times. Follow-up care is a key part of your treatment and safety. Be sure to make and go to all appointments, and call your doctor if you are having problems. It's also a good idea to know your test results and keep a list of the medicines you take. Where can you learn more? Go to https://Wootocracygolden.[employer]. org and sign in to your [employer] account. Enter P975 in the [employer] box to learn more about \"Neck: Exercises. \"     If you do not have an account, please click on the \"Sign Up Now\" link. Current as of: March 2, 2020               Content Version: 12.5  © 3559-3708 Healthwise, Incorporated. Care instructions adapted under license by [facility].  If you have questions about a medical condition or this instruction, always ask your healthcare professional. [address] any warranty or liability for your use of this information. Patient Education        Breast Reduction: Before Your Surgery  What is breast reduction? Breast reduction surgery removes a lot of the breast tissue and skin from the breasts. This reshapes and lifts the breasts and reduces their size. It can also make the dark area around the nipple smaller. Your doctor makes a cut around the dark area and down to the crease under the breast. The cut is called an incision. To reduce the breast size, the doctor removes extra skin and breast tissue. The doctor sews the remaining skin together. This tightens and lifts the breasts. The surgery may pull the nipples and the area around them into a different spot on the breasts. Your doctor may need to move your nipples higher. You may lose some feeling in them because of this. The doctor closes the incisions with stitches. After surgery, your breasts will weigh less. But you may have lasting scars on your breasts. And you may have less feeling in your breasts and nipples. Breast reduction may make it hard to breastfeed. Breast reduction surgery is usually done in a hospital or surgical center. You will likely be asleep for your surgery. You will probably be able to go home the same day. Depending on the type of work you do, you should be able to go back to work or your normal routine in 2 to 3 weeks. The incisions leave scars that usually fade a lot with time. Follow-up care is a key part of your treatment and safety. Be sure to make and go to all appointments, and call your doctor if you are having problems. It's also a good idea to know your test results and keep a list of the medicines you take. How do you prepare for surgery? Surgery can be stressful. This information will help you understand what you can expect.  And it will help you safely prepare for surgery. Preparing for surgery  · Be sure you have someone to take you home. Anesthesia and pain medicine will make it unsafe for you to drive or get home on your own. · Understand exactly what surgery is planned, along with the risks, benefits, and other options. · If you take aspirin or some other blood thinner, ask your doctor if you should stop taking it before your surgery. Make sure that you understand exactly what your doctor wants you to do. These medicines increase the risk of bleeding. · Tell your doctor ALL the medicines, vitamins, supplements, and herbal remedies you take. Some may increase the risk of problems during your surgery. Your doctor will tell you if you should stop taking any of them before the surgery and how soon to do it. · Make sure your doctor and the hospital have a copy of your advance directive. If you don't have one, you may want to prepare one. It lets others know your health care wishes. It's a good thing to have before any type of surgery or procedure. What happens on the day of surgery? · Follow the instructions exactly about when to stop eating and drinking. If you don't, your surgery may be canceled. If your doctor told you to take your medicines on the day of surgery, take them with only a sip of water. · Take a bath or shower before you come in for your surgery. Do not apply lotions, perfumes, deodorants, or nail polish. · Do not shave the surgical site yourself. · Take off all jewelry and piercings. And take out contact lenses, if you wear them. At the hospital or surgery center    · Bring a picture ID. · Your doctor will use a marker to draw lines on your breasts. He or she will use these lines during surgery to reshape your breasts. · You will be kept comfortable and safe by your anesthesia provider. The anesthesia may make you sleep. Or it may just numb the area being worked on.   · The surgery will take about 2 to 4 hours.  · You may have drain tubes in your breasts. When should you call your doctor? · You have questions or concerns. · You don't understand how to prepare for your surgery. · You become ill before the surgery (such as fever, flu, or a cold). · You need to reschedule or have changed your mind about having the surgery. Where can you learn more? Go to https://Mobile Patrol.Traiana. org and sign in to your [employer] account. Enter E381 in the [employer] box to learn more about \"Breast Reduction: Before Your Surgery. \"     If you do not have an account, please click on the \"Sign Up Now\" link. Current as of: October 31, 2019               Content Version: 12.5  © 6827-9135 Healthwise, Incorporated. Care instructions adapted under license by [facility] ([facility]). If you have questions about a medical condition or this instruction, always ask your healthcare professional. [place] [address] any warranty or liability for your use of this information. Patient Education        Breast Reduction: What to Expect at Home  Your Recovery  Breast reduction surgery removes some of the breast tissue and skin from the breasts. This reshapes and lifts the breasts and reduces their size. It can also make the dark area around the nipple smaller. After surgery, you will probably feel weak. You may feel sore for 2 to 3 weeks. You also may feel pulling or stretching in your breast area. Although you may need pain medicine for a week or two, you can expect to feel better and stronger each day. For several weeks, you may get tired easily or have less energy than usual. You also may have the feeling that fluid is moving in your breasts. This feeling is normal and will go away over time. Stitches usually are removed in 5 to 10 days. Your new breasts may feel firmer and look rounder. Breast reduction may change the normal feeling in your breast. But in time, some feeling may return.   Keep in mind aerobic exercise, until your doctor says it is okay. · Your doctor will tell you when to begin stretching exercises and normal activities. Ice  · Put ice or a cold pack over your breast for 10 to 20 minutes at a time. Try to do this every 1 to 2 hours for the next 3 days (when you are awake) or until the swelling goes down. Put a thin cloth between the ice and your skin. Other instructions  · You may have one or more drains near your incisions. Your doctor will tell you how to take care of them. Drains are usually removed in the first week after surgery. Follow-up care is a key part of your treatment and safety. Be sure to make and go to all appointments, and call your doctor if you are having problems. It's also a good idea to know your test results and keep a list of the medicines you take. When should you call for help? [id number] anytime you think you may need emergency care. For example, call if:  · You passed out (lost consciousness). · You have sudden chest pain and shortness of breath, or you cough up blood. Call your doctor now or seek immediate medical care if:  · You have pain that does not get better after you take pain medicine. · You have loose stitches, or your incision comes open. · You are bleeding from the incision. · You have signs of infection, such as:  ? Increased pain, swelling, warmth, or redness. ? Red streaks leading from the incision. ? Pus draining from the incision. ? A fever. · You have signs of a blood clot in your leg, such as:  ? Pain in your calf, back of the knee, thigh, or groin. ? Redness and swelling in your leg or groin. Watch closely for any changes in your health, and be sure to contact your doctor if:  · You do not get better as expected. Where can you learn more? Go to https://chkekeeb.[employer]. org and sign in to your [employer] account. Enter T113 in the [employer] box to learn more about \"Breast Reduction: What to Expect at Home. \" If you do not have an account, please click on the \"Sign Up Now\" link. Current as of: October 31, 2019               Content Version: 12.5  © 0129-6737 Healthwise, Incorporated. Care instructions adapted under license by [facility] ([facility]). If you have questions about a medical condition or this instruction, always ask your healthcare professional. [address] any warranty or liability for your use of this information.

## 2020-08-10 NOTE — PROGRESS NOTES
OFFICE PROGRESS NOTE  [address] [place] [address] Rd  [address] [facility] [address]  Dept: [phone]   Chief Complaint   Patient presents with    6 Month Follow-Up         HPI:   GERD: Patient complains of heartburn is well controlled as long as I take my medication every day. This has been associated with gas if I drink pop trying to wean off. She denies no other symptoms. Symptoms have been present for several years. She denies dysphagia. She has not lost weight. She denies melena, hematochezia, hematemesis, and coffee ground emesis. Medical therapy in the past has included proton pump inhibitors. She has chronic neck and lower back pain for many years. Several years ago tried to get a breast reduction but was denied. She is working full time for [employer] now and does a lot of standing and lifting which has worsened her back. Worse with bending, being on my feet all day. Her boyfriend gives her a massage every night which does help. No recent imaging. No injury. She doesn't want medication. EXAMINATION:    THREE XRAY VIEWS OF THE LUMBAR SPINE         8/10/2020 10:51 am         COMPARISON:    None.         HISTORY:    ORDERING SYSTEM PROVIDED HISTORY: Chronic bilateral low back pain without    sciatica    TECHNOLOGIST PROVIDED HISTORY:    Reason for exam:->chronic lumbar pain         FINDINGS:    Lumbar vertebral bodies are normal in height and alignment.   No evidence of    fracture. Visualized sacrum is unremarkable.         No significant degenerative changes.              Impression    Unremarkable examination of the lumbar spine. Pain in her neck and upper shoulders has been chronic for several years. Denies any injury. No radiation of pain into arms. She gets a lot of headaches occur at least every other day. Some times will start then go away. Nothing has helped with the neck. She did try to get a breast reduction done several years ago but was denied. Discussed neck pain may be related to her macromastia she is a 36 DDD.    EXAMINATION:    3 XRAY VIEWS OF THE CERVICAL SPINE         8/10/2020 10:51 am         COMPARISON:    None.         HISTORY:    ORDERING SYSTEM PROVIDED HISTORY: Chronic cervical pain    TECHNOLOGIST PROVIDED HISTORY:    Reason for exam:->chronic cervical pain         FINDINGS:    There is anatomical alignment of the cervical spine.  There is no evidence    spondylolisthesis.  The disc spaces are well maintained.  The posterior    elements are unremarkable.  The prevertebral soft tissues are unremarkable.              Impression    1.  Unremarkable cervical spine              Order History         Current Outpatient Medications:     valACYclovir (VALTREX) 500 MG tablet, TAKE 1 TABLET BY MOUTH EVERY DAY, Disp: , Rfl:     omeprazole (PRILOSEC) 20 MG delayed release capsule, Take 1 capsule by mouth Daily, Disp: 30 capsule, Rfl: 11  Social History     Socioeconomic History    Marital status:      Spouse name: None    Number of children: None    Years of education: None    Highest education level: None   Occupational History    None   Social Needs    Financial resource strain: None    Food insecurity     Worry: None     Inability: None    Transportation needs     Medical: None     Non-medical: None   Tobacco Use    Smoking status: Never Smoker    Smokeless tobacco: Never Used   Substance and Sexual Activity    Alcohol use: No    Drug use: No    Sexual activity: Never   Lifestyle    Physical activity     Days per week: None     Minutes per session: None    Stress: None   Relationships    Social connections     Talks on phone: None     Gets together: None     Attends Rastafarian service: None     Active member of club or organization: None     Attends meetings of clubs or organizations: None     Relationship status: None    Intimate partner violence     Fear of current or ex partner: None     Emotionally abused: None Physically abused: None     Forced sexual activity: None   Other Topics Concern    None   Social History Narrative    None       I have reviewed Radha's allergies, medications, problem list, medical, social and family history and have updated as needed in the electronic medical record    Review of Systems   Constitutional: Negative for activity change, appetite change, chills, diaphoresis, fatigue, fever and unexpected weight change. HENT: Negative for congestion, dental problem, drooling, ear discharge, ear pain, facial swelling, hearing loss, mouth sores, nosebleeds, postnasal drip, rhinorrhea, sinus pressure, sinus pain, sneezing, sore throat, tinnitus, trouble swallowing and voice change. Eyes: Negative for visual disturbance. Respiratory: Negative for cough, chest tightness, shortness of breath and wheezing. Cardiovascular: Negative for chest pain, palpitations and leg swelling. Gastrointestinal: Positive for constipation and diarrhea. Negative for abdominal pain, nausea and vomiting. See HPI   Endocrine: Negative for cold intolerance, heat intolerance, polydipsia, polyphagia and polyuria. Genitourinary: Negative for difficulty urinating, frequency and urgency. Musculoskeletal: Positive for back pain and neck pain. Negative for arthralgias, gait problem, joint swelling, myalgias and neck stiffness. Skin: Negative for color change, pallor, rash and wound. Allergic/Immunologic: Negative for environmental allergies, food allergies and immunocompromised state. Neurological: Positive for headaches. Negative for dizziness, tremors, seizures, syncope, facial asymmetry, speech difficulty, weakness, light-headedness and numbness. Hematological: Negative for adenopathy. Does not bruise/bleed easily. Psychiatric/Behavioral: Negative for agitation, behavioral problems, confusion, decreased concentration, dysphoric mood, hallucinations, self-injury, sleep disturbance and suicidal ideas. The patient is not nervous/anxious and is not hyperactive. OBJECTIVE:     VS:  Wt Readings from Last 3 Encounters:   08/10/20 153 lb (69.4 kg)   09/30/19 151 lb 9.6 oz (68.8 kg)   07/01/19 149 lb 8 oz (67.8 kg)                       Vitals:    08/10/20 0951   BP: 118/76   Pulse: 74   Resp: 18   Temp: 98.4 °F (36.9 °C)   SpO2: 99%   Weight: 153 lb (69.4 kg)   Height: 5' 1\" (1.549 m)       General: Alert and oriented to person, place, and time, well developed and well nourished, in no acute distress  SKIN: Warm and dry, intact without any rash, masses or lesions  HEAD: normocephalic, atraumatic  Eyes: sclera/conjunctiva clear, PERRLA, EOMI's intact  Neck: supple and non-tender without mass, trachea midline, no cervical lymphadenopathy, no bruit, no thyromegaly or nodules  Cardiovascular: regular rate and regular rhythm, normal S1 and S2,  no murmurs, rubs, clicks, or gallop. Distal pulses intact, no carotid bruits. No edema  Pulmonary/Chest: clear to auscultation bilaterally, no wheezes, rales or rhonchi, normal air movement, no respiratory distress  Abdomen: soft, non-tender, non-distended, normal bowel sounds, no masses or hepatosplenomegaly  Musculoskeletal: Normal ROM, no joint swelling, deformity or tenderness   Neurologic: reflexes normal and symmetric, no cranial nerve deficit, gait, coordination and speech normal  Extremities: no clubbing, cyanosis, or edema. Psychiatric: Good eye contact, normal mood and affect, answers questions appropriately    ASSESSMENT/PLAN   Radha was seen today for 6 month follow-up. Diagnoses and all orders for this visit:    Gastroesophageal reflux disease with esophagitis  -     CBC Auto Differential; Future  -     Comprehensive Metabolic Panel; Future  -     omeprazole (PRILOSEC) 20 MG delayed release capsule; Take 1 capsule by mouth Daily    Chronic bilateral low back pain without sciatica New  -     XR LUMBAR SPINE (2-3 VIEWS);  Future      Chronic cervical pain

## 2020-08-11 LAB — HIV-1 AND HIV-2 ANTIBODIES: NORMAL

## 2020-08-26 ASSESSMENT — PATIENT HEALTH QUESTIONNAIRE - PHQ9
SUM OF ALL RESPONSES TO PHQ QUESTIONS 1-9: 0
SUM OF ALL RESPONSES TO PHQ QUESTIONS 1-9: 0
SUM OF ALL RESPONSES TO PHQ9 QUESTIONS 1 & 2: 0
2. FEELING DOWN, DEPRESSED OR HOPELESS: 0
1. LITTLE INTEREST OR PLEASURE IN DOING THINGS: 0

## 2020-10-19 ENCOUNTER — OFFICE VISIT (OUTPATIENT)
Dept: SURGERY | Age: 50
End: 2020-10-19

## 2020-10-19 VITALS
TEMPERATURE: 97.8 F | HEART RATE: 78 BPM | BODY MASS INDEX: 28.9 KG/M2 | HEIGHT: 61 IN | OXYGEN SATURATION: 97 % | WEIGHT: 153.1 LBS

## 2020-10-19 PROCEDURE — 99024 POSTOP FOLLOW-UP VISIT: CPT | Performed by: PHYSICIAN ASSISTANT

## 2020-10-19 RX ORDER — NYSTATIN 100000 [USP'U]/G
POWDER TOPICAL
Qty: 1 BOTTLE | Refills: 5 | Status: SHIPPED
Start: 2020-10-19 | End: 2020-11-17 | Stop reason: ALTCHOICE

## 2020-10-20 ENCOUNTER — TELEPHONE (OUTPATIENT)
Dept: SURGERY | Age: 50
End: 2020-10-20

## 2020-10-20 NOTE — TELEPHONE ENCOUNTER
Called pt to see when she will be going for her mammo. She has an appt with PCP today at 3:00 and it will be ordered at that time. Patient will have that report sent to our office. Fax number provided. Once that report is received and reviewed we will proceed with prior auth. Patient aware of timeline/process. Notes and photos ready to submit.

## 2020-11-05 NOTE — TELEPHONE ENCOUNTER
Left VM for pt to call office to schedule procedure and also appt to meet with Dr Denita Colunga prior to surgery.

## 2020-11-10 NOTE — PROGRESS NOTES
Subjective: Follow up today from initial presentation for bilateral breast reduction. Denies fever, nausea, vomiting, leg pain or swelling. She presents today to further discuss surgical planning as she is now scheduled for her breast reduction. She voices no changes in her symptomatology since her previous visit with our office. Objective: There were no vitals filed for this visit. VITALS:  Pulse 75   Temp 97.4 °F (36.3 °C) (Temporal)   Ht 5' 1\" (1.549 m)   Wt 155 lb (70.3 kg)   LMP 11/04/2020   SpO2 98%   Breastfeeding No   BMI 29.29 kg/m²   CONSTITUTIONAL:  awake, alert, cooperative, no apparent distress, and appears stated age  EYES: PERRLA, EOMI, no signs of occular infection  MUSCULOSKELETAL: negative for flaccid muscle tone or spastic movements. NEURO: Cranial nerves II-XII grossly intact. No signs of agitated mood. LUNGS:  No increased work of breathing, good air exchange, clear to auscultation bilaterally, no crackles or wheezing  CARDIOVASCULAR:  Normal apical impulse, regular rate and rhythm,   ABDOMEN:  Normal bowel sounds, soft, non-distended, non-tender,   BREASTS: Rash is not noted, There are no masses palpated to b/l breast, no axillary lymphadenopathy, no nipple discharge. No breast pain. There are no previous scars. Bilateral Nipple sensation  intact, bilateral inframammary fold hyperhidrosis is noted. There is shoulder grooving present. There is grade 3 ptosis with breast significantly larger in proportion to the rest of her body. On standing she i has a hunched over posture.        Assessment:    Past Medical History:   Diagnosis Date    Abdominal bloating 2/27/2019    ASCUS with positive high risk HPV 11/16/2015    Sees Dr. Verenice Gutiérrez    Chronic back pain     Early satiety 2/27/2019    GERD (gastroesophageal reflux disease)     Other constipation 2/27/2019       CBC:   Lab Results   Component Value Date    WBC 6.8 9/19/2012    RBC 3.74 9/19/2012    HGB 10.9 9/19/2012    HCT 33.8 9/19/2012    MCV 90.3 9/19/2012    MCH 29.1 9/19/2012    MCHC 32.2 9/19/2012    RDW 14.4 9/19/2012     9/19/2012    MPV 8.9 9/19/2012     BMP:    Lab Results   Component Value Date     9/19/2012    K 3.6 9/19/2012     9/19/2012    CO2 28 9/19/2012    BUN 10 9/19/2012    CREATININE 0.8 9/19/2012    CALCIUM 8.7 9/19/2012    LABGLOM >60 9/19/2012    GLUCOSE 100 9/19/2012     Hepatic Function Panel:    No results found for this basename: ALKPHOS, ALT, AST, PROT, BILITOT, BILIDIR, IBILI, LABALBU      Patient Active Problem List   Diagnosis    Macromastia       Plan:     -Symptomatic macromastia    I met with the patient today to discuss her preauthorized bilateral breast reduction for symptomatic macromastia. I examined the patient and concur with surgical planning. I furthermore discussed with the patient the preoperative intraoperative postoperative care associated with bilateral breast reduction. She had no further questions and would like to proceed as planned.    -The risks, benefits and options were discussed with the pt. The risks included but not limited to pain, bleeding, infection, heavy scarring, damage to surrounding structures,  and need for further procedures. Other risks including but not limited to asymmetry, loss of nipple or/and areola, loss of sensation to nipple and areola, inability to breast feed, seroma, hematoma, implant failure, capsular contracture, and fat necrosis were also discussed with the patient. All of her questions were answered to her satisfaction and she agrees to proceed with the operation. The visit lasted greater than 15 minutes, with 50% of the time in counseling, education, review of the case, and coordination of care. F/U day of surgery    Call office with concerns or signs of infection.     Tamiko Lozoya

## 2020-11-11 ENCOUNTER — OFFICE VISIT (OUTPATIENT)
Dept: SURGERY | Age: 50
End: 2020-11-11
Payer: COMMERCIAL

## 2020-11-11 VITALS
WEIGHT: 155 LBS | TEMPERATURE: 97.4 F | BODY MASS INDEX: 29.27 KG/M2 | OXYGEN SATURATION: 98 % | HEART RATE: 75 BPM | HEIGHT: 61 IN

## 2020-11-11 PROCEDURE — 99213 OFFICE O/P EST LOW 20 MIN: CPT | Performed by: PLASTIC SURGERY

## 2020-11-18 ENCOUNTER — HOSPITAL ENCOUNTER (OUTPATIENT)
Age: 50
Discharge: HOME | End: 2020-11-20
Payer: COMMERCIAL

## 2020-11-18 PROCEDURE — U0003 INFECTIOUS AGENT DETECTION BY NUCLEIC ACID (DNA OR RNA); SEVERE ACUTE RESPIRATORY SYNDROME CORONAVIRUS 2 (SARS-COV-2) (CORONAVIRUS DISEASE [COVID-19]), AMPLIFIED PROBE TECHNIQUE, MAKING USE OF HIGH THROUGHPUT TECHNOLOGIES AS DESCRIBED BY CMS-2020-01-R: HCPCS

## 2020-11-18 NOTE — TELEPHONE ENCOUNTER
[place] [facility] [place] [address] [facility]. Pre-Admission Testing will call you prior to surgery to inform you arrival time and any other additional directions, if they are unable to reach you, please call them two days prior at [phone]. If taking Fish Oil, Vitamins, two weeks prior to surgery stop taking. If taking NSAIDS (such as Aspirin, Ibuprofen)  anticoagulants please consult with your prescribing physician to get further instructions on when to stop medication prior to surgery that is scheduled, patient understood.     Pre-Auth #: [id number]  CPT Codes: N539606  ICD-10 Codes: Y47

## 2020-11-20 ENCOUNTER — TELEPHONE (OUTPATIENT)
Dept: SURGERY | Age: 50
End: 2020-11-20

## 2020-11-20 LAB
SARS-COV-2: NOT DETECTED
SOURCE: NORMAL

## 2020-11-20 NOTE — TELEPHONE ENCOUNTER
Patient stated had a procedure at her gynecologist and they informed her that she has cancer and the breast reduction needs cancelled for Monday 11/23/2020. Procedure cancelled at this time and  Scheduling and Ankit Estrada at facility notified.

## 2020-12-17 NOTE — PROGRESS NOTES
Department of Plastic Surgery - Adult  Attending Consult Note        CHIEF COMPLAINT:  Symptomatic Macromastia    History Obtained From:  patient    HISTORY OF PRESENT ILLNESS:                The patient is a 54 y.o. female who presents with bilateral symptomatic macromastia. The patient complains of back pain, neck pain, shoulder pain, shoulder grooving and intertrigo , Intermittent numbness and tingling in bilateral hands and arms. She does admit to having increased headaches over the past 48 months which she associated with her breast symptomatolgy. The patient states that she has been dealing with these associated symptoms , for 30 years. She is a currently a size 38DDD cup. She has undergone a trial of weight loss, NSAIDS, specialty bras and none of them fit. She at times has to wear sports bras all day with a bra, She states that she has done conservative thearapy for  over 12 months. She states that her weight does fluctuate, but to no discernable change in her breast symptomology. She is not a diabetic. The pt states the weight and size of her breasts are effecting her ADLS. She states she is limited to how much she can work at [employer] as she is lifting all day long. She attributes this to the large size of her breasts, and this has been effecting her for many years. She does use OTC powders and creams for her bilateral breast inframmamary fold rashes. She does have refractory symtoms after using powders and ointments such as nystatin. The pt does perform a self breast exam frequently. The patients personal history includes sister with breast cancer.      Past Medical History:    Past Medical History:   Diagnosis Date    Abdominal bloating 2/27/2019    ASCUS with positive high risk HPV 11/16/2015    Sees Dr. Lissette Torres    Chronic back pain     Early satiety 2/27/2019    GERD (gastroesophageal reflux disease)     Other constipation 2/27/2019     Past Surgical History:    Past Surgical History: Pt arrived from [facility].  Ambulated to bed with assist of 2.  NYDIA is draining.  Stab wounds approximated x4 and NYDIA dressing has some drainage under dressing Bp 142/74, p-87 and saturation 92% on 2 liters.  Temp 98.2  Dr Hendrix here to talk with pt   Procedure Laterality Date    APPENDECTOMY      TUBAL LIGATION       Current Medications:      Current Outpatient Medications   Medication Sig Dispense Refill    valACYclovir (VALTREX) 500 MG tablet TAKE 1 TABLET BY MOUTH EVERY DAY      omeprazole (PRILOSEC) 20 MG delayed release capsule Take 1 capsule by mouth Daily 30 capsule 11     No current facility-administered medications for this visit. Allergies:  Patient has no known allergies.           Social History:   Social History     Socioeconomic History    Marital status:      Spouse name: Not on file    Number of children: Not on file    Years of education: Not on file    Highest education level: Not on file   Occupational History    Not on file   Social Needs    Financial resource strain: Not on file    Food insecurity     Worry: Not on file     Inability: Not on file   [employer] needs     Medical: Not on file     Non-medical: Not on file   Tobacco Use    Smoking status: Never Smoker    Smokeless tobacco: Never Used   Substance and Sexual Activity    Alcohol use: No    Drug use: No    Sexual activity: Never   Lifestyle    Physical activity     Days per week: Not on file     Minutes per session: Not on file    Stress: Not on file   Relationships    Social connections     Talks on phone: Not on file     Gets together: Not on file     Attends Jehovah's witness service: Not on file     Active member of club or organization: Not on file     Attends meetings of clubs or organizations: Not on file     Relationship status: Not on file    Intimate partner violence     Fear of current or ex partner: Not on file     Emotionally abused: Not on file     Physically abused: Not on file     Forced sexual activity: Not on file   Other Topics Concern    Not on file   Social History Narrative    Not on file     Family History:   Family History   Problem Relation Age of Onset    COPD Mother    Danica Reed Other Mother         diverticulitis    Heart Disease Father     Heart Attack Father 27         at 80 with MI    High Cholesterol Sister     Early Death Brother 28        aneurysm brain    Arthritis Maternal Grandmother         RA    Kidney Disease Maternal Grandmother     Dementia Maternal Grandmother     Cancer Maternal Grandfather         lung cancer    Stroke Paternal Grandmother     Heart Attack Paternal [de-identified]     Mental Illness Paternal Grandfather     Diabetes Sister     COPD Sister     No Known Problems Sister     Breast Cancer Sister 21    Cancer Sister 50        thyroid cancer    Cancer Maternal Aunt 58        lymphoma    Cancer Maternal Uncle 27        gastric cancer    Heart Disease Brother     Coronary Art Dis Brother 28        CABG       REVIEW OF SYSTEMS:    CONSTITUTIONAL:  negative for  fevers, chills, sweats and fatigue  EYES: negative for dipolpia or acute vision loss. RESPIRATORY:  negative for  dry cough, cough with sputum, dyspnea, wheezing and chest pain  CARDIOVASCULAR:  negative for  chest pain, dyspnea, palpitations, syncope  GASTROINTESTINAL:  negative for nausea, vomiting, change in bowel habits, diarrhea, constipation and abdominal pain  EXTREMITIES: negative for edema  MUSCULOSKELETAL: negative for muscle weakness  SKIN: negative for itching or rashes. BEHAVIOR/PSYCH:  negative for poor appetite, increased appetite, decreased sleep and poor concentration  All other review of systems negative    PHYSICAL EXAM:    VITALS:  Ht 5' 1\" (1.549 m)   Wt 153 lb 1.6 oz (69.4 kg)   BMI 28.93 kg/m²   CONSTITUTIONAL:  awake, alert, cooperative, no apparent distress, and appears stated age  EYES: PERRLA, EOMI, no signs of occular infection  LUNGS:  No increased work of breathing, good air exchange,   CARDIOVASCULAR:   regular rate and rhythm, EXTREMITIES: no signs of clubbing or cyanosis. MUSCULOSKELETAL: negative for flaccid muscle tone or spastic movements.   SKIN: gross examination reveals no signs of rashes, or diaphoresis. NEURO: Cranial nerves II-XII grossly intact. No signs of agitated mood. BREAST: Rash is not noted, There are no masses palpated to b/l breast, no axillary lymphadenopathy, no nipple discharge. No breast pain. There are no previous scars. Bilateral Nipple sensation  intact, lateral inframammary fold hyperhidrosis is appreciated    DATA:    Radiology Review:  New mammogram needed  Breast Measurements were taken and are noted in the media section. IMPRESSION/RECOMMENDATIONS:      Diagnosis  -) Bilateral Macromastia  -) Bilateral Inframammary fold Pruritis  -) Neck Pain/Back Pain    -Breast Measurements obtained and will be scanned into media. Photos were obtained. Chaperone present  -Based on the patients history, ROS, and PE, It is in my medical opinion the patients symptomology is directly correlated to the size, mass and weight of her bilateralbreast tissue. Because this patient has tried and failed conservative treatment for the relief of her symptoms, The patient would benefit from a bilateral breast reduction.     - We will have the patient arrange a release of medical records from her PCP to obtain clinical history. - Discussed with the patient on healthy diet and exercise for continued BMI reduction.   - Screening Mammogram will need to be ordered from PCP/OBGYN reviewed and sent to our office.   -Nystatin powder ordered to pharmacy today. Proposed 900 gram removal of the Left breast  Proposed 900 gram removal of the Right breast    The risks, benefits and options were discussed with the pt. The risks included but not limited to pain, bleeding, infection, heavy scarring, damage to surrounding structures, fluid collections, asymmetry, and need for further procedures. All of Her questions were answered to her satisfaction and She agrees to proceed with the operation.     FU - after prior Gabriela Dejesus

## 2021-01-19 ENCOUNTER — OFFICE VISIT (OUTPATIENT)
Dept: FAMILY MEDICINE CLINIC | Age: 51
End: 2021-01-19
Payer: COMMERCIAL

## 2021-01-19 VITALS
RESPIRATION RATE: 18 BRPM | BODY MASS INDEX: 28.75 KG/M2 | WEIGHT: 152.3 LBS | DIASTOLIC BLOOD PRESSURE: 78 MMHG | SYSTOLIC BLOOD PRESSURE: 118 MMHG | HEIGHT: 61 IN | HEART RATE: 71 BPM | OXYGEN SATURATION: 98 % | TEMPERATURE: 97.6 F

## 2021-01-19 DIAGNOSIS — G89.29 CHRONIC CERVICAL PAIN: ICD-10-CM

## 2021-01-19 DIAGNOSIS — M54.2 CHRONIC CERVICAL PAIN: ICD-10-CM

## 2021-01-19 DIAGNOSIS — K21.00 GASTROESOPHAGEAL REFLUX DISEASE WITH ESOPHAGITIS WITHOUT HEMORRHAGE: ICD-10-CM

## 2021-01-19 DIAGNOSIS — N62 MACROMASTIA: Primary | ICD-10-CM

## 2021-01-19 PROCEDURE — G8427 DOCREV CUR MEDS BY ELIG CLIN: HCPCS | Performed by: NURSE PRACTITIONER

## 2021-01-19 PROCEDURE — G8419 CALC BMI OUT NRM PARAM NOF/U: HCPCS | Performed by: NURSE PRACTITIONER

## 2021-01-19 PROCEDURE — G8510 SCR DEP NEG, NO PLAN REQD: HCPCS | Performed by: NURSE PRACTITIONER

## 2021-01-19 PROCEDURE — G8484 FLU IMMUNIZE NO ADMIN: HCPCS | Performed by: NURSE PRACTITIONER

## 2021-01-19 PROCEDURE — 99213 OFFICE O/P EST LOW 20 MIN: CPT | Performed by: NURSE PRACTITIONER

## 2021-01-19 PROCEDURE — 1036F TOBACCO NON-USER: CPT | Performed by: NURSE PRACTITIONER

## 2021-01-19 ASSESSMENT — ENCOUNTER SYMPTOMS
SHORTNESS OF BREATH: 0
ABDOMINAL PAIN: 0
COUGH: 0
DIARRHEA: 0
WHEEZING: 0
NAUSEA: 0
BACK PAIN: 1
CONSTIPATION: 0
VOMITING: 0

## 2021-01-19 ASSESSMENT — PATIENT HEALTH QUESTIONNAIRE - PHQ9
2. FEELING DOWN, DEPRESSED OR HOPELESS: 0
1. LITTLE INTEREST OR PLEASURE IN DOING THINGS: 0
SUM OF ALL RESPONSES TO PHQ9 QUESTIONS 1 & 2: 0

## 2021-01-19 NOTE — PATIENT INSTRUCTIONS
The medication list included in this document is our record of what you are currently taking, including any changes that were made at today's visit.  If you find any differences when compared to your medications at home, or have any questions that were not answered at your visit, please contact the office. Patient Education        Breast Reduction: Before Your Surgery  What is breast reduction surgery? Breast reduction surgery removes a lot of the breast tissue and skin from the breasts. This reshapes and lifts the breasts and reduces their size. It can also make the dark area around the nipple smaller. Your doctor makes a cut around the dark area and down to the crease under the breast. The doctor then removes extra skin and breast tissue and sews the remaining skin together. After surgery, your breasts will weigh less. But you may have lasting scars on your breasts. And you may have less feeling in your breasts and nipples. Breast reduction may make it hard to breastfeed. You will probably be able to go home the same day. Depending on the type of work you do, you should be able to go back to work or your normal routine in 2 to 3 weeks. The incisions leave scars that usually fade with time. Follow-up care is a key part of your treatment and safety. Be sure to make and go to all appointments, and call your doctor if you are having problems. It's also a good idea to know your test results and keep a list of the medicines you take. How do you prepare for surgery? Surgery can be stressful. This information will help you understand what you can expect. And it will help you safely prepare for surgery. Preparing for surgery    · Be sure you have someone to take you home. Anesthesia and pain medicine will make it unsafe for you to drive or get home on your own.     · Understand exactly what surgery is planned, along with the risks, benefits, and other options.   · If you take aspirin or some other blood thinner, ask your doctor if you should stop taking it before your surgery. Make sure that you understand exactly what your doctor wants you to do. These medicines increase the risk of bleeding.     · Tell your doctor ALL the medicines, vitamins, supplements, and herbal remedies you take. Some may increase the risk of problems during your surgery. Your doctor will tell you if you should stop taking any of them before the surgery and how soon to do it.     · Make sure your doctor and the hospital have a copy of your advance directive. If you don't have one, you may want to prepare one. It lets others know your health care wishes. It's a good thing to have before any type of surgery or procedure. What happens on the day of surgery? · Follow the instructions exactly about when to stop eating and drinking. If you don't, your surgery may be canceled. If your doctor told you to take your medicines on the day of surgery, take them with only a sip of water.     · Take a bath or shower before you come in for your surgery. Do not apply lotions, perfumes, deodorants, or nail polish.     · Do not shave the surgical site yourself.     · Take off all jewelry and piercings. And take out contact lenses, if you wear them. At the hospital or surgery center   · Bring a picture ID.     · Your doctor will use a marker to draw lines on your breasts. He or she will use these lines during surgery to reshape your breasts.     · You will be kept comfortable and safe by your anesthesia provider. The anesthesia may make you sleep. Or it may just numb the area being worked on.     · The surgery will take about 2 to 4 hours.     · You may have drain tubes in your breasts. When should you call your doctor? · You have questions or concerns.     · You don't understand how to prepare for your surgery.     · You become ill before the surgery (such as fever, flu, or a cold).   · You need to reschedule or have changed your mind about having the surgery. Where can you learn more? Go to [email or web address] and sign in to your [employer] account. Enter E381 in the [employer] box to learn more about \"Breast Reduction: Before Your Surgery. \"     If you do not have an account, please click on the \"Sign Up Now\" link. Current as of: July 2, 2020               Content Version: 12.6  © 2006-2020 [employer]. Care instructions adapted under license by [facility] ([facility]). If you have questions about a medical condition or this instruction, always ask your healthcare professional. [address] any warranty or liability for your use of this information. Patient Education        Breast Reduction Surgery: What to Expect at Home  Your Recovery  Breast reduction surgery removes some of the breast tissue and skin from the breasts. This reshapes and lifts the breasts and reduces their size. It can also make the dark area around the nipple smaller. After surgery, you will probably feel weak. You may feel sore for 2 to 3 weeks. You also may feel pulling or stretching in your breast area. Although you may need pain medicine for a week or two, you can expect to feel better and stronger each day. For several weeks, you may get tired easily or have less energy than usual. You also may have the feeling that fluid is moving in your breasts. This feeling is normal and will go away over time. Stitches usually are removed in 5 to 10 days. Your new breasts may feel firmer and look rounder. Breast reduction may change the normal feeling in your breast. But in time, some feeling may return. Keep in mind that it may take time to get used to your new breasts. You will have swelling at first. But the breasts will soften and develop better shape over time. This care sheet gives you a general idea about how long it will take for you to recover. But each person recovers at a different pace. Follow the steps below to get better as quickly as possible. How can you care for yourself at home? Activity    · Rest when you feel tired. Getting enough sleep will help you recover.     · For about 2 weeks after surgery, avoid lifting anything that would make you strain. This may include heavy grocery bags and milk containers, a heavy briefcase or backpack, cat litter or dog food bags, a vacuum , or a child. Do not lift anything over your head for 2 to 3 weeks.     · Ask your doctor when you can drive again.     · Ask your doctor when it is okay for you to have sex.     · You can take your first shower the day after your drain or bandage is removed. This is usually within about 1 week. Sometimes doctors say it is okay to shower the day after surgery. Do not take a bath or soak in a hot tub for about 4 weeks.     · You will probably be able to go back to work or your normal routine in 2 to 3 weeks. This depends on the type of work you do and any further treatment. Diet    · You can eat your normal diet. If your stomach is upset, try bland, low-fat foods like plain rice, broiled chicken, toast, and yogurt.     · Drink plenty of fluids (unless your doctor tells you not to).     · You may notice that your bowel movements are not regular right after your surgery. This is common. Try to avoid constipation and straining with bowel movements. Take a fiber supplement. If you have not had a bowel movement after a couple of days, take a mild laxative. Medicines    · Your doctor will tell you if and when you can restart your medicines. He or she will also give you instructions about taking any new medicines.   · If you take aspirin or some other blood thinner, ask your doctor if and when to start taking it again. Make sure that you understand exactly what your doctor wants you to do.     · Take pain medicines exactly as directed. ? If the doctor gave you a prescription medicine for pain, take it as prescribed. ? If you are not taking a prescription pain medicine, ask your doctor if you can take an over-the-counter medicine.     · If you think your pain medicine is making you sick to your stomach:  ? Take your medicine after meals (unless your doctor has told you not to). ? Ask your doctor for a different pain medicine.     · If you were given medicine for nausea, take it as directed.     · If your doctor prescribed antibiotics, take them as directed. Do not stop taking them just because you feel better. You need to take the full course of antibiotics. Incision care    · If your doctor gave you specific instructions on how to care for your incision, follow those instructions.     · You may be wearing a special bra that holds your bandages in place after the surgery. Your doctor will tell you when you can stop wearing the bra. Your doctor may want you to wear the bra at night as well as during the day for several weeks. Do not wear an underwire bra for 1 month.     · If you have strips of tape on your incision, leave the tape on for a week or until it falls off. Or follow your doctor's instructions for removing the tape.     · Wash the area daily with warm, soapy water, and pat it dry. Don't use hydrogen peroxide or alcohol, which can slow healing.     · You may cover the area with a gauze bandage if it weeps or rubs against clothing. Change the bandage every day. Consider having someone help you with this. Exercise    · Try to walk each day. Start by walking a little more than you did the day before. Bit by bit, increase the amount you walk. Walking boosts blood flow and helps prevent pneumonia and constipation.   · Avoid strenuous activities, such as bicycle riding, jogging, weight lifting, or aerobic exercise, until your doctor says it is okay.     · Your doctor will tell you when to begin stretching exercises and normal activities. Ice    · Put ice or a cold pack over your breast for 10 to 20 minutes at a time. Try to do this every 1 to 2 hours for the next 3 days (when you are awake) or until the swelling goes down. Put a thin cloth between the ice and your skin. Other instructions    · You may have one or more drains near your incisions. Your doctor will tell you how to take care of them. Drains are usually removed in the first week after surgery. Follow-up care is a key part of your treatment and safety. Be sure to make and go to all appointments, and call your doctor if you are having problems. It's also a good idea to know your test results and keep a list of the medicines you take. When should you call for help? Call 911 anytime you think you may need emergency care. For example, call if:    · You passed out (lost consciousness).     · You have sudden chest pain and shortness of breath, or you cough up blood. Call your doctor now or seek immediate medical care if:    · You have pain that does not get better after you take pain medicine.     · You have loose stitches, or your incision comes open.     · You are bleeding from the incision.     · You have signs of infection, such as:  ? Increased pain, swelling, warmth, or redness. ? Red streaks leading from the incision. ? Pus draining from the incision. ? A fever.     · You have signs of a blood clot in your leg, such as:  ? Pain in your calf, back of the knee, thigh, or groin. ? Redness and swelling in your leg or groin. Watch closely for any changes in your health, and be sure to contact your doctor if:    · You do not get better as expected. Where can you learn more? Go to [email or web address] and sign in to your [employer] account. Enter T113 in the [employer] box to learn more about \"Breast Reduction Surgery: What to Expect at Home. \"     If you do not have an account, please click on the \"Sign Up Now\" link. Current as of: July 2, 2020               Content Version: 12.6  © 2006-2020 [employer]. Care instructions adapted under license by [facility] ([facility]). If you have questions about a medical condition or this instruction, always ask your healthcare professional. [address] any warranty or liability for your use of this information.

## 2021-01-19 NOTE — PROGRESS NOTES
OFFICE PROGRESS NOTE  [address]  [address] [facility] [address]  Dept: [phone]   Chief Complaint   Patient presents with    Back Pain     discuss breast reduction    Health Maintenance     mammo done at [facility], declined flu, due for hep c         HPI:   Here today for discussion of breast reduction, she was scheduled for surgery and had to cancel due to abnormal pap and cervical cancer. She had Robotic hysterectomy bilateral salpingectomy with Dr Juliet Mcdonald on 12/15/2020 she had focal residual cervical high grade squamous intraepithelial lesion. At this time no further oncological treatment needed but she will have gyn exam every year rotating with Dr Jovanny Cortez and DR Juliet Mcdonald for the next 5 years. She has chronic neck and lower back pain for many years. Several years ago tried to get a breast reduction but was denied. She is working full time for [employer] now and does a lot of standing and lifting which has worsened her back. Worse with bending, being on my feet all day. Her boyfriend gives her a massage every night which does help. No recent imaging. No injury. She doesn't want medication. She is in 36DDD and she is small framed,     Discussed recommend she wait for 8 months to have breast reduction due to the Leep Procedure in November and then her hysterectomy in December as she will most likely be off work 6 weeks recovering from breast reduction as she lifts for work. She had her last mammogram at [facility] either October or November will get results. GERD has been stable as long as she doesn't miss a dose of the omeprazole. But if she forgets to take it her heartburn is bad and will wake up and have to take Gaviscon. Discussed today she has been on omeprazole for a long time and the adverse effects of chronic use.        Current Outpatient Medications:   ibuprofen (ADVIL;MOTRIN) 600 MG tablet, Take 1 tablet by mouth 3 times daily as needed for Pain, Disp: 30 tablet, Rfl: 0    Psyllium (METAMUCIL PO), Take by mouth daily, Disp: , Rfl:     valACYclovir (VALTREX) 500 MG tablet, Take 500 mg by mouth daily as needed , Disp: , Rfl:     omeprazole (PRILOSEC) 20 MG delayed release capsule, Take 1 capsule by mouth Daily, Disp: 30 capsule, Rfl: 11  Social History     Socioeconomic History    Marital status:      Spouse name: None    Number of children: None    Years of education: None    Highest education level: None   Occupational History    None   Social Needs    Financial resource strain: None    Food insecurity     Worry: None     Inability: None    Transportation needs     Medical: None     Non-medical: None   Tobacco Use    Smoking status: Never Smoker    Smokeless tobacco: Never Used   Substance and Sexual Activity    Alcohol use: No    Drug use: No    Sexual activity: Never   Lifestyle    Physical activity     Days per week: None     Minutes per session: None    Stress: None   Relationships    Social connections     Talks on phone: None     Gets together: None     Attends Sikhism service: None     Active member of club or organization: None     Attends meetings of clubs or organizations: None     Relationship status: None    Intimate partner violence     Fear of current or ex partner: None     Emotionally abused: None     Physically abused: None     Forced sexual activity: None   Other Topics Concern    None   Social History Narrative    None       I have reviewed Radha's allergies, medications, problem list, medical, social and family history and have updated as needed in the electronic medical record    Review of Systems   Constitutional: Negative for activity change, appetite change, chills, diaphoresis, fatigue, fever and unexpected weight change. Respiratory: Negative for cough, shortness of breath and wheezing. Cardiovascular: Negative for chest pain, palpitations and leg swelling. Gastrointestinal: Negative for abdominal pain, constipation, diarrhea, nausea and vomiting. Heartburn   Genitourinary: Negative for pelvic pain, vaginal bleeding, vaginal discharge and vaginal pain. Musculoskeletal: Positive for back pain ( from large breasts). Negative for arthralgias, gait problem, joint swelling, myalgias, neck pain and neck stiffness. Psychiatric/Behavioral: Negative for agitation, behavioral problems, confusion, decreased concentration, dysphoric mood, hallucinations, self-injury, sleep disturbance and suicidal ideas. The patient is not nervous/anxious and is not hyperactive. OBJECTIVE:     VS:  Wt Readings from Last 3 Encounters:   01/19/21 152 lb 4.8 oz (69.1 kg)   01/11/21 154 lb 6.4 oz (70 kg)   12/29/20 155 lb (70.3 kg)                       Vitals:    01/19/21 0853   BP: 118/78   Pulse: 71   Resp: 18   Temp: 97.6 °F (36.4 °C)   SpO2: 98%   Weight: 152 lb 4.8 oz (69.1 kg)   Height: 5' 1\" (1.549 m)       General: Alert and oriented to person, place, and time, well developed and well nourished, in no acute distress  SKIN: Warm and dry, intact without any rash, masses or lesions  HEAD: normocephalic, atraumatic  Neck: supple and non-tender without mass, trachea midline, no cervical lymphadenopathy, no bruit, no thyromegaly or nodule  Breast: Large breasts, no masses, lesions. Cardiovascular: regular rate and regular rhythm, normal S1 and S2,  no murmurs, rubs, clicks, or gallop. Distal pulses intact, no carotid bruits.  No edema  Pulmonary/Chest: clear to auscultation bilaterally, no wheezes, rales or rhonchi, normal air movement, no respiratory distress  Abdomen: soft, non-tender, non-distended, normal bowel sounds, no masses or hepatosplenomegaly  Musculoskeletal: Normal ROM, no joint swelling, deformity or tenderness Neurologic: reflexes normal and symmetric, no cranial nerve deficit, gait, coordination and speech normal  Extremities: no clubbing, cyanosis, or edema. Psychiatric: Good eye contact, normal mood and affect, answers questions appropriately    ASSESSMENT/PLAN   Radha was seen today for back pain and health maintenance. Diagnoses and all orders for this visit:    Macromastia    Gastroesophageal reflux disease with esophagitis without hemorrhage    Discussed waiting for 6 - 8 months since she has had anesthesia twice in 2 months. Return in about 6 months (around 7/19/2021) for GERD. Discussed exercising 30 minutes daily and Discussed taking medications as directed and adverse effects        I have reviewed my findings and recommendations with Radha Salter.     Pricila Allred, NP-C, FNP-BC

## 2021-03-18 ENCOUNTER — TELEPHONE (OUTPATIENT)
Dept: SURGERY | Age: 51
End: 2021-03-18

## 2021-03-18 NOTE — TELEPHONE ENCOUNTER
Patient called back to schedule. Surgery has been scheduled at [address] on 6/1/2021, Pre-Admission Testing will call you prior to surgery to inform you arrival time and any other additional directions,if they are unable to reach you,please call them two days prior at [phone]. If taking Fish Oil, Vitamins, two weeks prior to surgery stop taking. If taking NSAIDS (such as Aspirin, Ibuprofen) anticoagulants please consult with your prescribing physician to get further instructions on when to stop medication prior to surgery that is scheduled, patient understood.     Pre-Auth #: [id number]  CPT Codes: 30043  ICD 10: I54

## 2021-05-06 NOTE — H&P
Attending Consult Note           CHIEF COMPLAINT:  Symptomatic Macromastia     History Obtained From:  patient     HISTORY OF PRESENT ILLNESS:                 The patient is a 54 y.o. female who presents with bilateral symptomatic macromastia.  The patient complains of back pain, neck pain, shoulder pain, shoulder grooving and intertrigo , Intermittent numbness and tingling in bilateral hands and arms.  She does admit to having increased headaches over the past 48 months which she associated with her breast symptomatolgy.  The patient states that she has been dealing with these associated symptoms , for 30 years.  She is a currently a size 38DDD cup.  She has undergone a trial of weight loss, NSAIDS, specialty bras and none of them fit. She at times has to wear sports bras all day with a bra, She states that she has done conservative thearapy for  over 12 months.  She states that her weight does fluctuate, but to no discernable change in her breast symptomology. Jaime Pizarro is not a diabetic.  The pt states the weight and size of her breasts are effecting her ADLS.   She states she is limited to how much she can work at [employer] as she is lifting all day long.  She attributes this to the large size of her breasts, and this has been effecting her for many years. Jaime Pizarro does use OTC powders and creams for her bilateral breast inframmamary fold rashes.  She does have refractory symtoms after using powders and ointments such as nystatin.  The pt does perform a self breast exam frequently.   The patients personal history includes sister with breast cancer.      Past Medical History:    Past Medical History           Past Medical History:   Diagnosis Date    Abdominal bloating 2/27/2019    ASCUS with positive high risk HPV 11/16/2015     Sees Dr. Cooper Bonilla    Chronic back pain      Early satiety 2/27/2019    GERD (gastroesophageal reflux disease)      Other constipation 2/27/2019         Past Surgical History:    Past Surgical Narrative    Not on file         Family History:   Family History             Family History   Problem Relation Age of Onset    COPD Mother      Other Mother           diverticulitis    Heart Disease Father      Heart Attack Father 27          at 80 with MI    High Cholesterol Sister      Early Death Brother 28         aneurysm brain    Arthritis Maternal Grandmother           RA    Kidney Disease Maternal Grandmother      Dementia Maternal Grandmother      Cancer Maternal Grandfather           lung cancer    Stroke Paternal Grandmother      Heart Attack Paternal Grandmother      Mental Illness Paternal Grandfather      Diabetes Sister      COPD Sister      No Known Problems Sister      Breast Cancer Sister 21    Cancer Sister 50         thyroid cancer    Cancer Maternal Aunt 58         lymphoma    Cancer Maternal Uncle 27         gastric cancer    Heart Disease Brother      Coronary Art Dis Brother 28         CABG            REVIEW OF SYSTEMS:    CONSTITUTIONAL:  negative for  fevers, chills, sweats and fatigue  EYES: negative for dipolpia or acute vision loss. RESPIRATORY:  negative for  dry cough, cough with sputum, dyspnea, wheezing and chest pain  CARDIOVASCULAR:  negative for  chest pain, dyspnea, palpitations, syncope  GASTROINTESTINAL:  negative for nausea, vomiting, change in bowel habits, diarrhea, constipation and abdominal pain  EXTREMITIES: negative for edema  MUSCULOSKELETAL: negative for muscle weakness  SKIN: negative for itching or rashes.   BEHAVIOR/PSYCH:  negative for poor appetite, increased appetite, decreased sleep and poor concentration  All other review of systems negative     PHYSICAL EXAM:    VITALS:  Ht 5' 1\" (1.549 m)   Wt 153 lb 1.6 oz (69.4 kg)   BMI 28.93 kg/m²   CONSTITUTIONAL: Jolayne Odilon, alert, cooperative, no apparent distress, and appears stated age  EYES: PERRLA, EOMI, no signs of occular infection  LUNGS:  No increased work of breathing, good air exchange,   CARDIOVASCULAR:   regular rate and rhythm, EXTREMITIES: no signs of clubbing or cyanosis. MUSCULOSKELETAL: negative for flaccid muscle tone or spastic movements. SKIN: gross examination reveals no signs of rashes, or diaphoresis. NEURO: Cranial nerves II-XII grossly intact. No signs of agitated mood.      BREAST: Rash is not noted, There are no masses palpated to b/l breast, no axillary lymphadenopathy, no nipple discharge. No breast pain. There are no previous scars.  Bilateral Nipple sensation  intact, lateral inframammary fold hyperhidrosis is appreciated     DATA:    Radiology Review:  New mammogram needed  Breast Measurements were taken and are noted in the media section.     IMPRESSION/RECOMMENDATIONS:       Diagnosis  -) Bilateral Macromastia  -) Bilateral Inframammary fold Pruritis  -) Neck Pain/Back Pain     -Breast Measurements obtained and will be scanned into media.  Photos were obtained. Chaperone present  -Based on the patients history, ROS, and PE, It is in my medical opinion the patients symptomology is directly correlated to the size, mass and weight of her bilateralbreast tissue.  Because this patient has tried and failed conservative treatment for the relief of her symptoms, The patient would benefit from a bilateral breast reduction.      - We will have the patient arrange a release of medical records from her PCP to obtain clinical history.     - Discussed with the patient on healthy diet and exercise for continued BMI reduction.   - Screening Mammogram will need to be ordered from PCP/OBGYN reviewed and sent to our office.   -Nystatin powder ordered to pharmacy today.     Proposed 900 gram removal of the Left breast  Proposed 900 gram removal of the Right breast     The risks, benefits and options were discussed with the pt.  The risks included but not limited to pain, bleeding, infection, heavy scarring, damage to surrounding structures, fluid collections, asymmetry, and need for further procedures. All of Her questions were answered to her satisfaction and She agrees to proceed with the operation.     Attestation    No change in patient's H & P. I have reviewed the procedure with the patient as well as the risk and benefits. They have no further questions and agree to proceed with surgery.     Jose Murillo MD   9:02 AM  6/1/2021

## 2021-05-26 NOTE — PROGRESS NOTES
[facility] 38 PRE-ADMISSION TESTING GENERAL INSTRUCTIONS- [facility]-phone number:[phone]    GENERAL INSTRUCTIONS  [x] Antibacterial Soap shower Night before and/or AM of Surgery  [] Jaden wipe instruction sheet and wipes given. [x] Nothing by mouth after midnight, including gum, candy, mints, or water. [x] You may brush your teeth, gargle, but do NOT swallow water. []Hibiclens shower  the night before and the morning of surgery. Do not use             Hibiclens on your face or head. [x]No smoking, chewing tobacco, illegal drugs, or alcohol within 24 hours of your surgery. [x] Jewelry, valuables or body piercing's should not be brought to the hospital. All body and/or tongue piercing's must be removed prior to arriving to hospital.  ALL hair pins must be removed. [x] Do not wear makeup, lotions, powders, deodorant. Nail polish as directed by the nurse. [x] Arrange transportation with a responsible adult  to and from the hospital. If you do not have a responsible adult  to transport you, you will need to make arrangements with a medical transportation company (i.e. [employer]. A Uber/taxi/bus is not appropriate unless you are accompanied by a responsible adult ). Arrange for someone to be with you for the remainder of the day and for 24 hours after your procedure due to having had anesthesia. Who will be your  for transportation?______FRIEND, AME___________   Who will be staying with you for 24 hrs after your procedure?___________FAMILY_______  [x] Bring insurance card and photo ID.  [] Transfusion Bracelet: Please bring with you to hospital, day of surgery  [] Bring urine specimen day of surgery. Any small container is acceptable. [] Use inhalers the morning of surgery and bring with you to hospital.  [] Bring copy of living will or healthcare power of  papers to be placed in your electronic record.   [] CPAP/BI-PAP: Please bring your machine if you are to spend the night in the hospital.     PARKING INSTRUCTIONS:   [x] Arrival Time:___0800_________  · [x] Parking lot '\"I\"  is located on [facility] (the corner of [facility] and [facility]). To enter, press the button and the gate will lift. A free token will be provided to exit the lot. One car per patient is allowed to park in this lot. All other cars are to park on [address] [place] [address] either in the parking garage or the handicap lot. [] To reach the [facility] lobby from [address] [place] [address], upon entering the hospital, take elevator B to the 3rd floor. EDUCATION INSTRUCTIONS:      [] Knee or hip replacement booklet & exercise pamphlets given. [] Christie 77 placed in chart. [] Pre-admission Testing educational folder given  [x] Incentive Spirometry,coughing & deep breathing exercises reviewed. []Medication information sheet(s)   []Fluoroscopy-Xray used in surgery reviewed with patient. Educational pamphlet placed in chart. [x]Pain: Post-op pain is normal and to be expected. You will be asked to rate your pain from 0-10(a zero is not acceptable-education is needed). Your post-op pain goal is:  [x] Ask your nurse for your pain medication. [] Joint camp offered. [] Joint replacement booklets given. [] Other:___________________________    MEDICATION INSTRUCTIONS:   [x]Bring a complete list of your medications, please write the last time you took the medicine, give this list to the nurse. [x] Take the following medications the morning of surgery with 1-2 ounces of water: SEE LIST  [x] Stop herbal supplements and vitamins 5 days before your surgery. [] DO NOT take any diabetic medicine the morning of surgery. Follow instructions for insulin the day before surgery. [] If you are diabetic and your blood sugar is low or you feel symptomatic, you may drink 1-2 ounces of apple juice or take a glucose tablet.   The morning of your procedure, you may call the pre-op area if you have concerns about your blood sugar [phone]. [] Use your inhalers the morning of surgery. Bring your emergency inhaler with you day of surgery. [x] Follow physician instructions regarding any blood thinners you may be taking. WHAT TO EXPECT:  [x] The day of surgery you will be greeted and checked in by the [employer].  In addition, you will be registered in the [facility] by a Patient Access Representative. Please bring your photo ID and insurance card. A nurse will greet you in accordance to the time you are needed in the pre-op area to prepare you for surgery. Please do not be discouraged if you are not greeted in the order you arrive as there are many variables that are involved in patient preparation. Your patience is greatly appreciated as you wait for your nurse. Please bring in items such as: books, magazines, newspapers, electronics, or any other items  to occupy your time in the waiting area. [x]  Delays may occur with surgery and staff will make a sincere effort to keep you informed of delays. If any delays occur with your procedure, we apologize ahead of time for your inconvenience as we recognize the value of your time.

## 2021-05-27 ENCOUNTER — HOSPITAL ENCOUNTER (OUTPATIENT)
Age: 51
Discharge: HOME | End: 2021-05-29
Payer: COMMERCIAL

## 2021-05-27 DIAGNOSIS — U07.1 COVID-19: ICD-10-CM

## 2021-05-27 PROCEDURE — U0005 INFEC AGEN DETEC AMPLI PROBE: HCPCS

## 2021-05-27 PROCEDURE — U0003 INFECTIOUS AGENT DETECTION BY NUCLEIC ACID (DNA OR RNA); SEVERE ACUTE RESPIRATORY SYNDROME CORONAVIRUS 2 (SARS-COV-2) (CORONAVIRUS DISEASE [COVID-19]), AMPLIFIED PROBE TECHNIQUE, MAKING USE OF HIGH THROUGHPUT TECHNOLOGIES AS DESCRIBED BY CMS-2020-01-R: HCPCS

## 2021-05-29 LAB
SARS-COV-2: NOT DETECTED
SOURCE: NORMAL

## 2021-06-01 ENCOUNTER — ANESTHESIA (OUTPATIENT)
Dept: OPERATING ROOM | Age: 51
End: 2021-06-01
Payer: COMMERCIAL

## 2021-06-01 ENCOUNTER — HOSPITAL ENCOUNTER (OUTPATIENT)
Age: 51
Setting detail: OUTPATIENT SURGERY
Discharge: HOME | End: 2021-06-01
Attending: PLASTIC SURGERY | Admitting: PLASTIC SURGERY
Payer: COMMERCIAL

## 2021-06-01 ENCOUNTER — ANESTHESIA EVENT (OUTPATIENT)
Dept: OPERATING ROOM | Age: 51
End: 2021-06-01
Payer: COMMERCIAL

## 2021-06-01 VITALS
SYSTOLIC BLOOD PRESSURE: 105 MMHG | RESPIRATION RATE: 17 BRPM | TEMPERATURE: 98.2 F | HEART RATE: 70 BPM | OXYGEN SATURATION: 99 % | BODY MASS INDEX: 27.38 KG/M2 | WEIGHT: 145 LBS | HEIGHT: 61 IN | DIASTOLIC BLOOD PRESSURE: 64 MMHG

## 2021-06-01 VITALS — DIASTOLIC BLOOD PRESSURE: 112 MMHG | OXYGEN SATURATION: 92 % | SYSTOLIC BLOOD PRESSURE: 137 MMHG

## 2021-06-01 DIAGNOSIS — U07.1 COVID-19: Primary | ICD-10-CM

## 2021-06-01 DIAGNOSIS — G89.18 POST-OP PAIN: ICD-10-CM

## 2021-06-01 PROCEDURE — 2580000003 HC RX 258: Performed by: PLASTIC SURGERY

## 2021-06-01 PROCEDURE — 3700000001 HC ADD 15 MINUTES (ANESTHESIA): Performed by: PLASTIC SURGERY

## 2021-06-01 PROCEDURE — 7100000010 HC PHASE II RECOVERY - FIRST 15 MIN: Performed by: PLASTIC SURGERY

## 2021-06-01 PROCEDURE — 2500000003 HC RX 250 WO HCPCS: Performed by: PLASTIC SURGERY

## 2021-06-01 PROCEDURE — 6360000002 HC RX W HCPCS: Performed by: PHYSICIAN ASSISTANT

## 2021-06-01 PROCEDURE — 3600000003 HC SURGERY LEVEL 3 BASE: Performed by: PLASTIC SURGERY

## 2021-06-01 PROCEDURE — 2709999900 HC NON-CHARGEABLE SUPPLY: Performed by: PLASTIC SURGERY

## 2021-06-01 PROCEDURE — 2720000010 HC SURG SUPPLY STERILE: Performed by: PLASTIC SURGERY

## 2021-06-01 PROCEDURE — 2580000003 HC RX 258: Performed by: PHYSICIAN ASSISTANT

## 2021-06-01 PROCEDURE — 6370000000 HC RX 637 (ALT 250 FOR IP): Performed by: ANESTHESIOLOGY

## 2021-06-01 PROCEDURE — 6360000002 HC RX W HCPCS: Performed by: ANESTHESIOLOGY

## 2021-06-01 PROCEDURE — 19318 BREAST REDUCTION: CPT | Performed by: PLASTIC SURGERY

## 2021-06-01 PROCEDURE — 2780000010 HC IMPLANT OTHER: Performed by: PLASTIC SURGERY

## 2021-06-01 PROCEDURE — 3700000000 HC ANESTHESIA ATTENDED CARE: Performed by: PLASTIC SURGERY

## 2021-06-01 PROCEDURE — 7100000011 HC PHASE II RECOVERY - ADDTL 15 MIN: Performed by: PLASTIC SURGERY

## 2021-06-01 PROCEDURE — 19318 BREAST REDUCTION: CPT | Performed by: PHYSICIAN ASSISTANT

## 2021-06-01 PROCEDURE — 7100000000 HC PACU RECOVERY - FIRST 15 MIN: Performed by: PLASTIC SURGERY

## 2021-06-01 PROCEDURE — 7100000001 HC PACU RECOVERY - ADDTL 15 MIN: Performed by: PLASTIC SURGERY

## 2021-06-01 PROCEDURE — 6360000002 HC RX W HCPCS: Performed by: PLASTIC SURGERY

## 2021-06-01 PROCEDURE — 3600000013 HC SURGERY LEVEL 3 ADDTL 15MIN: Performed by: PLASTIC SURGERY

## 2021-06-01 PROCEDURE — 88305 TISSUE EXAM BY PATHOLOGIST: CPT

## 2021-06-01 PROCEDURE — 6360000002 HC RX W HCPCS

## 2021-06-01 RX ORDER — SODIUM CHLORIDE 0.9 % (FLUSH) 0.9 %
5-40 SYRINGE (ML) INJECTION PRN
Status: DISCONTINUED | OUTPATIENT
Start: 2021-06-01 | End: 2021-06-01 | Stop reason: HOSPADM

## 2021-06-01 RX ORDER — SODIUM CHLORIDE 9 MG/ML
25 INJECTION, SOLUTION INTRAVENOUS PRN
Status: DISCONTINUED | OUTPATIENT
Start: 2021-06-01 | End: 2021-06-01 | Stop reason: HOSPADM

## 2021-06-01 RX ORDER — SODIUM CHLORIDE 0.9 % (FLUSH) 0.9 %
5-40 SYRINGE (ML) INJECTION EVERY 12 HOURS SCHEDULED
Status: DISCONTINUED | OUTPATIENT
Start: 2021-06-01 | End: 2021-06-01 | Stop reason: HOSPADM

## 2021-06-01 RX ORDER — ONDANSETRON 2 MG/ML
INJECTION INTRAMUSCULAR; INTRAVENOUS PRN
Status: DISCONTINUED | OUTPATIENT
Start: 2021-06-01 | End: 2021-06-01 | Stop reason: SDUPTHER

## 2021-06-01 RX ORDER — ONDANSETRON 2 MG/ML
4 INJECTION INTRAMUSCULAR; INTRAVENOUS
Status: DISCONTINUED | OUTPATIENT
Start: 2021-06-01 | End: 2021-06-01 | Stop reason: HOSPADM

## 2021-06-01 RX ORDER — BUPIVACAINE HYDROCHLORIDE AND EPINEPHRINE 2.5; 5 MG/ML; UG/ML
INJECTION, SOLUTION EPIDURAL; INFILTRATION; INTRACAUDAL; PERINEURAL PRN
Status: DISCONTINUED | OUTPATIENT
Start: 2021-06-01 | End: 2021-06-01 | Stop reason: ALTCHOICE

## 2021-06-01 RX ORDER — ACETAMINOPHEN 500 MG
1000 TABLET ORAL ONCE
Status: COMPLETED | OUTPATIENT
Start: 2021-06-01 | End: 2021-06-01

## 2021-06-01 RX ORDER — DEXAMETHASONE SODIUM PHOSPHATE 10 MG/ML
INJECTION, SOLUTION INTRAMUSCULAR; INTRAVENOUS PRN
Status: DISCONTINUED | OUTPATIENT
Start: 2021-06-01 | End: 2021-06-01 | Stop reason: SDUPTHER

## 2021-06-01 RX ORDER — CEPHALEXIN 500 MG/1
500 CAPSULE ORAL 4 TIMES DAILY
Qty: 20 CAPSULE | Refills: 0 | Status: SHIPPED | OUTPATIENT
Start: 2021-06-01 | End: 2021-06-06

## 2021-06-01 RX ORDER — ONDANSETRON 4 MG/1
4 TABLET, FILM COATED ORAL DAILY PRN
Qty: 12 TABLET | Refills: 1 | Status: SHIPPED | OUTPATIENT
Start: 2021-06-01 | End: 2021-07-06

## 2021-06-01 RX ORDER — LIDOCAINE HYDROCHLORIDE 20 MG/ML
INJECTION, SOLUTION INTRAVENOUS PRN
Status: DISCONTINUED | OUTPATIENT
Start: 2021-06-01 | End: 2021-06-01 | Stop reason: SDUPTHER

## 2021-06-01 RX ORDER — MIDAZOLAM HYDROCHLORIDE 1 MG/ML
INJECTION INTRAMUSCULAR; INTRAVENOUS PRN
Status: DISCONTINUED | OUTPATIENT
Start: 2021-06-01 | End: 2021-06-01 | Stop reason: SDUPTHER

## 2021-06-01 RX ORDER — SCOLOPAMINE TRANSDERMAL SYSTEM 1 MG/1
1 PATCH, EXTENDED RELEASE TRANSDERMAL
Status: DISCONTINUED | OUTPATIENT
Start: 2021-06-01 | End: 2021-06-01 | Stop reason: HOSPADM

## 2021-06-01 RX ORDER — OXYCODONE HYDROCHLORIDE AND ACETAMINOPHEN 5; 325 MG/1; MG/1
1 TABLET ORAL EVERY 6 HOURS PRN
Qty: 15 TABLET | Refills: 0 | Status: SHIPPED | OUTPATIENT
Start: 2021-06-01 | End: 2021-06-08

## 2021-06-01 RX ORDER — PROMETHAZINE HYDROCHLORIDE 25 MG/ML
6.25 INJECTION, SOLUTION INTRAMUSCULAR; INTRAVENOUS
Status: COMPLETED | OUTPATIENT
Start: 2021-06-01 | End: 2021-06-01

## 2021-06-01 RX ORDER — FENTANYL CITRATE 50 UG/ML
INJECTION, SOLUTION INTRAMUSCULAR; INTRAVENOUS PRN
Status: DISCONTINUED | OUTPATIENT
Start: 2021-06-01 | End: 2021-06-01 | Stop reason: SDUPTHER

## 2021-06-01 RX ORDER — PROPOFOL 10 MG/ML
INJECTION, EMULSION INTRAVENOUS PRN
Status: DISCONTINUED | OUTPATIENT
Start: 2021-06-01 | End: 2021-06-01 | Stop reason: SDUPTHER

## 2021-06-01 RX ADMIN — SODIUM CHLORIDE 25 ML: 9 INJECTION, SOLUTION INTRAVENOUS at 09:01

## 2021-06-01 RX ADMIN — FENTANYL CITRATE 25 MCG: 50 INJECTION, SOLUTION INTRAMUSCULAR; INTRAVENOUS at 11:48

## 2021-06-01 RX ADMIN — FENTANYL CITRATE 25 MCG: 50 INJECTION, SOLUTION INTRAMUSCULAR; INTRAVENOUS at 11:58

## 2021-06-01 RX ADMIN — PROMETHAZINE HYDROCHLORIDE 6.25 MG: 25 INJECTION INTRAMUSCULAR; INTRAVENOUS at 13:21

## 2021-06-01 RX ADMIN — HYDROMORPHONE HYDROCHLORIDE 0.5 MG: 1 INJECTION, SOLUTION INTRAMUSCULAR; INTRAVENOUS; SUBCUTANEOUS at 13:03

## 2021-06-01 RX ADMIN — FENTANYL CITRATE 100 MCG: 50 INJECTION, SOLUTION INTRAMUSCULAR; INTRAVENOUS at 11:25

## 2021-06-01 RX ADMIN — ONDANSETRON HYDROCHLORIDE 4 MG: 2 INJECTION, SOLUTION INTRAMUSCULAR; INTRAVENOUS at 12:32

## 2021-06-01 RX ADMIN — PROPOFOL 20 MG: 10 INJECTION, EMULSION INTRAVENOUS at 12:10

## 2021-06-01 RX ADMIN — SODIUM CHLORIDE: 9 INJECTION, SOLUTION INTRAVENOUS at 12:32

## 2021-06-01 RX ADMIN — DEXAMETHASONE SODIUM PHOSPHATE 10 MG: 10 INJECTION, SOLUTION INTRAMUSCULAR; INTRAVENOUS at 11:25

## 2021-06-01 RX ADMIN — MIDAZOLAM 2 MG: 1 INJECTION INTRAMUSCULAR; INTRAVENOUS at 11:17

## 2021-06-01 RX ADMIN — ACETAMINOPHEN 1000 MG: 500 TABLET ORAL at 09:48

## 2021-06-01 RX ADMIN — PROPOFOL 150 MG: 10 INJECTION, EMULSION INTRAVENOUS at 11:25

## 2021-06-01 RX ADMIN — FENTANYL CITRATE 50 MCG: 50 INJECTION, SOLUTION INTRAMUSCULAR; INTRAVENOUS at 12:40

## 2021-06-01 RX ADMIN — FENTANYL CITRATE 50 MCG: 50 INJECTION, SOLUTION INTRAMUSCULAR; INTRAVENOUS at 12:12

## 2021-06-01 RX ADMIN — Medication 2 G: at 11:30

## 2021-06-01 RX ADMIN — LIDOCAINE HYDROCHLORIDE 80 MG: 20 INJECTION, SOLUTION INTRAVENOUS at 11:25

## 2021-06-01 ASSESSMENT — PULMONARY FUNCTION TESTS
PIF_VALUE: 18
PIF_VALUE: 17
PIF_VALUE: 18
PIF_VALUE: 2
PIF_VALUE: 17
PIF_VALUE: 17
PIF_VALUE: 0
PIF_VALUE: 2
PIF_VALUE: 15
PIF_VALUE: 2
PIF_VALUE: 17
PIF_VALUE: 17
PIF_VALUE: 19
PIF_VALUE: 1
PIF_VALUE: 0
PIF_VALUE: 15
PIF_VALUE: 13
PIF_VALUE: 15
PIF_VALUE: 19
PIF_VALUE: 18
PIF_VALUE: 17
PIF_VALUE: 13
PIF_VALUE: 17
PIF_VALUE: 14
PIF_VALUE: 13
PIF_VALUE: 1
PIF_VALUE: 17
PIF_VALUE: 17
PIF_VALUE: 2
PIF_VALUE: 17
PIF_VALUE: 0
PIF_VALUE: 10
PIF_VALUE: 4
PIF_VALUE: 17
PIF_VALUE: 15
PIF_VALUE: 13
PIF_VALUE: 19
PIF_VALUE: 17
PIF_VALUE: 15
PIF_VALUE: 15
PIF_VALUE: 18
PIF_VALUE: 15
PIF_VALUE: 12
PIF_VALUE: 14
PIF_VALUE: 17
PIF_VALUE: 17
PIF_VALUE: 7
PIF_VALUE: 17
PIF_VALUE: 8
PIF_VALUE: 13
PIF_VALUE: 13
PIF_VALUE: 17
PIF_VALUE: 14
PIF_VALUE: 13
PIF_VALUE: 19
PIF_VALUE: 17
PIF_VALUE: 17
PIF_VALUE: 0
PIF_VALUE: 15
PIF_VALUE: 17
PIF_VALUE: 17
PIF_VALUE: 13
PIF_VALUE: 18
PIF_VALUE: 17
PIF_VALUE: 18
PIF_VALUE: 17
PIF_VALUE: 27
PIF_VALUE: 17
PIF_VALUE: 19
PIF_VALUE: 17
PIF_VALUE: 13
PIF_VALUE: 17
PIF_VALUE: 10
PIF_VALUE: 13

## 2021-06-01 ASSESSMENT — PAIN DESCRIPTION - FREQUENCY: FREQUENCY: CONTINUOUS

## 2021-06-01 ASSESSMENT — PAIN DESCRIPTION - PAIN TYPE
TYPE: SURGICAL PAIN
TYPE: SURGICAL PAIN

## 2021-06-01 ASSESSMENT — PAIN DESCRIPTION - DESCRIPTORS
DESCRIPTORS: ACHING;BURNING;CONSTANT
DESCRIPTORS: ACHING;BURNING;CONSTANT

## 2021-06-01 ASSESSMENT — PAIN SCALES - GENERAL
PAINLEVEL_OUTOF10: 0
PAINLEVEL_OUTOF10: 0
PAINLEVEL_OUTOF10: 4
PAINLEVEL_OUTOF10: 8
PAINLEVEL_OUTOF10: 0
PAINLEVEL_OUTOF10: 4
PAINLEVEL_OUTOF10: 3

## 2021-06-01 ASSESSMENT — PAIN DESCRIPTION - ORIENTATION
ORIENTATION: RIGHT;LEFT
ORIENTATION: RIGHT;LEFT

## 2021-06-01 ASSESSMENT — PAIN DESCRIPTION - LOCATION
LOCATION: BREAST
LOCATION: BREAST

## 2021-06-01 ASSESSMENT — PAIN DESCRIPTION - ONSET: ONSET: ON-GOING

## 2021-06-01 NOTE — ANESTHESIA PRE PROCEDURE
Department of Anesthesiology  Preprocedure Note       Name:  Kevin Mondragon   Age:  52 y.o.  :  1970                                          MRN:  [id number]         Date:  2021      Surgeon: Dez Leonard):  Samy Juares MD    Procedure: Procedure(s):  BILATERAL BREAST REDUCTION    Medications prior to admission:   Prior to Admission medications    Medication Sig Start Date End Date Taking?  Authorizing Provider   Psyllium (METAMUCIL PO) Take by mouth daily   Yes Historical Provider, MD   omeprazole (PRILOSEC) 20 MG delayed release capsule Take 1 capsule by mouth Daily 8/10/20  Yes Emma Proctor, APRN - CNP   valACYclovir (VALTREX) 500 MG tablet Take 500 mg by mouth daily as needed  20   Historical Provider, MD       Current medications:    Current Facility-Administered Medications   Medication Dose Route Frequency Provider Last Rate Last Admin    sodium chloride flush 0.9 % injection 5-40 mL  5-40 mL Intravenous 2 times per day SUSHIL Heard        sodium chloride flush 0.9 % injection 5-40 mL  5-40 mL Intravenous PRN SUSHIL Heard        0.9 % sodium chloride infusion  25 mL Intravenous PRN SUSHIL Heard 100 mL/hr at 21 0901 25 mL at 21 0901    ceFAZolin (ANCEF) 2000 mg in sterile water 20 mL IV syringe  2,000 mg Intravenous On Call to [address] [place] [address]           Allergies:  No Known Allergies    Problem List:    Patient Active Problem List   Diagnosis Code    Chronic back pain M54.9, G89.29    Gastroesophageal reflux disease with esophagitis K21.00    FH: CAD (coronary artery disease) Z82.49    FH: diabetes mellitus Z83.3    FH: thyroid disease Z83.49    Irritable bowel syndrome with both constipation and diarrhea K58.2    FH: colon polyps Z83.71    FH: hyperlipidemia Z83.438    Family history of early CAD Z80.55    Macromastia N62    Chronic cervical pain M54.2, G89.29    Cervical cancer, FIGO stage IA1 ([facility] [address] C53.9       Past Medical History:        Diagnosis Date    Abdominal bloating 2/27/2019    ASCUS with positive high risk HPV 11/16/2015    Sees Dr. Violette Harvey    Cervical cancer [facility])     stage IA    Chronic back pain     Early satiety 2/27/2019    GERD (gastroesophageal reflux disease)     Other constipation 2/27/2019       Past Surgical History:        Procedure Laterality Date    APPENDECTOMY  2009    HEMORRHOID SURGERY      HYSTERECTOMY      LEEP  11/2020    CARLI AND BSO  12/29/2020    Laparoscopic; Dr. Kaur Vivar       Social History:    Social History     Tobacco Use    Smoking status: Never Smoker    Smokeless tobacco: Never Used   Substance Use Topics    Alcohol use: No                                Counseling given: Not Answered      Vital Signs (Current):   Vitals:    05/26/21 0952 06/01/21 0845   BP:  139/89   Pulse:  70   Resp:  16   Temp:  97.9 °F (36.6 °C)   TempSrc:  Temporal   SpO2:  100%   Weight: 145 lb (65.8 kg) 145 lb (65.8 kg)   Height: 5' 1\" (1.549 m) 5' 1\" (1.549 m)                                              BP Readings from Last 3 Encounters:   06/01/21 139/89   01/19/21 118/78   01/11/21 127/88       NPO Status: Time of last liquid consumption: 2100                        Time of last solid consumption: 1900                        Date of last liquid consumption: 05/31/21                        Date of last solid food consumption: 05/31/21    BMI:   Wt Readings from Last 3 Encounters:   06/01/21 145 lb (65.8 kg)   01/19/21 152 lb 4.8 oz (69.1 kg)   01/11/21 154 lb 6.4 oz (70 kg)     Body mass index is 27.4 kg/m².     CBC:   Lab Results   Component Value Date    WBC 7.6 08/10/2020    RBC 4.30 08/10/2020    HGB 11.7 12/29/2020    HCT 35.8 12/29/2020    MCV 91.9 08/10/2020    RDW 14.6 08/10/2020     08/10/2020       CMP:   Lab Results   Component Value Date     08/10/2020    K 4.4 08/10/2020     08/10/2020    CO2 22 08/10/2020    BUN 16 08/10/2020    CREATININE 0.8 08/10/2020    GFRAA >60 08/10/2020    LABGLOM >60 08/10/2020    GLUCOSE 87 08/10/2020    PROT 7.3 08/10/2020    CALCIUM 9.3 08/10/2020    BILITOT 0.3 08/10/2020    ALKPHOS 69 08/10/2020    AST 20 08/10/2020    ALT 12 08/10/2020       POC Tests: No results for input(s): POCGLU, POCNA, POCK, POCCL, POCBUN, POCHEMO, POCHCT in the last 72 hours. Coags: No results found for: PROTIME, INR, APTT    HCG (If Applicable): No results found for: PREGTESTUR, PREGSERUM, HCG, HCGQUANT     ABGs: No results found for: PHART, PO2ART, OFO9FAX, VZO3TKH, BEART, V5OOSWDK     Type & Screen (If Applicable):  Lab Results   Component Value Date    LABABO O 12/29/2020       Drug/Infectious Status (If Applicable):  No results found for: HIV, HEPCAB    COVID-19 Screening (If Applicable):   Lab Results   Component Value Date    COVID19 Not Detected 05/27/2021           Anesthesia Evaluation    Airway: Mallampati: II  TM distance: >3 FB   Neck ROM: full  Mouth opening: > = 3 FB Dental: normal exam         Pulmonary: breath sounds clear to auscultation                             Cardiovascular:            Rhythm: regular                      Neuro/Psych:               GI/Hepatic/Renal:   (+) GERD: well controlled,           Endo/Other:                     Abdominal:           Vascular:                                      Anesthesia Plan      general     ASA 2       Induction: intravenous. MIPS: Postoperative opioids intended and Prophylactic antiemetics administered.                       Korin Leyva MD   6/1/2021

## 2021-06-01 NOTE — OP NOTE
Operative Note      Patient: Radha Salter  YOB: 1970  MRN: [id number]    Date of Procedure: 6/1/2021    Pre-Op Diagnosis: SYMPTOMATIC MACROMASTIA BILATERAL    Post-Op Diagnosis: Same       Procedure(s):  BILATERAL BREAST REDUCTION    Surgeon(s):  Derrick Carrillo MD    Assistant:   Physician Assistant: SUSHIL Rojo  Resident: Gera Bradley DO    Anesthesia: General    Estimated Blood Loss (mL): less than 50     Complications: None    Specimens:   ID Type Source Tests Collected by Time Destination   A : RIGHT BREAST Tissue Breast SURGICAL PATHOLOGY Derrick Carrillo MD 6/1/2021 1201    B : LEFT BREAST Tissue Tissue SURGICAL PATHOLOGY Derrick Carrillo MD 6/1/2021 1221        Implants:  * No implants in log *      Drains: * No LDAs found *        Detailed Description of Procedure:         ASSISTANT:  Gaby Chairez PA-C. PA was required for the case due  to lack of adequately trained assistant; was involved in prepping,  draping, retracting, dressing and suturing. SPECIMEN:  Bilateral breast skin and tissue. Resected weight on the left  side 669.4 gm, on the right side 650.6 gm. PREOPERATIVE INDICATIONS:  The patient is a 52 y.o. female with history of bilateral symptomatic macromastia, refractory to conservative medical management. The patient elected to proceed with a bilateral breast reduction. Risks, benefits and alternatives were explained to the patient including but not limited to bleeding, scarring, infection, asymmetries, loss of nipple and areolar complex, and need for further surgery. She understood and elected to proceed. She was seen on the day of surgery, marked and all questions were answered. These markings were done in the preoperative holding area in the standing position. A Chavez-pattern skin incision was designed with an inferiorly-based 8 cm wide pedicle.   The nipple and areolar complex were placed at the level of the inframammary fold which corresponded to 22 cm from the sternal notch. The nipple and areola were also resized to 38 mm. She was taken back to the operating room, placed in the supine position. SCDs were on and functioning at the time of induction of anesthesia. Preoperative antibiotics were given prior to incision. The area was prepped and draped in the usual sterile fashion with chlorhexidine prep stick following a wash with chlorhexidine Jaden wipes. Tumecent infiltration was instilled with 500cc on each side and allowed to work. A 10-blade was used to score all incisions and the right pedicle was de-epithelialized with a Brown forceps and 10-blade and dissection was then carried circumferentially around the pedicle with PEAK PlasmaBlade with care to spare the inferior vascularized base. 2 cm thick cephalically-based skin flaps were then created, and with the aid of mastectomy hooks, were uniformly dissected down to the chest wall. The intervening tissue was removed from the patient and weighed. Further cephalic dissection was performed to allow for cephalic advancement of the inferior pedicle to prevent disrupting the optimal contour. Hemostasis was achieved with monopolar cautery. The wound was irrigated with normal saline. Topical Leonila 5 gm was applied to each breast and the wound was closed with 3-0 Monocryl deep dermal, Insorb staples, and 4-0 Monocryl running subcuticular sutures. The reduction was performed in the exact same manner on the contralateral side with every attempt to gasca symmetry. 0.25% Marcaine with 1:100,000 epinephrine was injected along the incision lines. Dermabond was applied followed by 1 inch Steri-Strips, Kerlix fluffs, and a surgical bra. She emerged from anesthesia without complication and taken to PACU in good condition.       Electronically signed by Maegan Combs MD on 6/1/2021 at 12:46 PM

## 2021-06-01 NOTE — ANESTHESIA POSTPROCEDURE EVALUATION
Department of Anesthesiology  Postprocedure Note    Patient: Jean-Pierre Tomas  MRN: [id number]  YOB: 1970  Date of evaluation: 6/1/2021  Time:  1:04 PM     Procedure Summary     Date: 06/01/21 Room / Location: [address] [place] [address] OR 01 / [facility]    Anesthesia Start: 1117 Anesthesia Stop: 1250    Procedure: BILATERAL BREAST REDUCTION (Bilateral Breast) Diagnosis: (SYMPTOMATIC MACROMASTIA BILATERAL)    Surgeons: Maegan Combs MD Responsible Provider: Apurva Stephenson MD    Anesthesia Type: general ASA Status: 2          Anesthesia Type: general    Rolly Phase I: Rolly Score: 9    Rolly Phase II:      Last vitals: Reviewed and per EMR flowsheets.        Anesthesia Post Evaluation    Patient location during evaluation: PACU  Patient participation: complete - patient participated  Level of consciousness: awake  Pain score: 0  Airway patency: patent  Nausea & Vomiting: no nausea  Complications: no  Cardiovascular status: hemodynamically stable  Respiratory status: acceptable  Hydration status: stable

## 2021-06-07 ENCOUNTER — OFFICE VISIT (OUTPATIENT)
Dept: SURGERY | Age: 51
End: 2021-06-07

## 2021-06-07 VITALS
HEART RATE: 90 BPM | OXYGEN SATURATION: 99 % | BODY MASS INDEX: 28.34 KG/M2 | WEIGHT: 150.1 LBS | TEMPERATURE: 97.4 F | HEIGHT: 61 IN

## 2021-06-07 DIAGNOSIS — N62 MACROMASTIA: Primary | ICD-10-CM

## 2021-06-07 PROCEDURE — 99024 POSTOP FOLLOW-UP VISIT: CPT | Performed by: PHYSICIAN ASSISTANT

## 2021-06-07 RX ORDER — CEPHALEXIN 500 MG/1
500 CAPSULE ORAL 4 TIMES DAILY
Qty: 20 CAPSULE | Refills: 0 | Status: SHIPPED | OUTPATIENT
Start: 2021-06-07 | End: 2021-06-12

## 2021-06-07 NOTE — PROGRESS NOTES
Subjective: Follow up today from bilateral breast reduction. Denies fever, nausea, vomiting, leg pain or swelling, pain is mild to moderate. He states he accidentally threw away her antibiotics as she thought they were old antibiotics. She is requesting an additional prescription at this time. She is wearing her surgical bra as directed. She voices no complaints at this time. Objective:    Pulse 90   Temp 97.4 °F (36.3 °C)   Ht 5' 1\" (1.549 m)   Wt 150 lb 1.6 oz (68.1 kg)   LMP 12/09/2020   SpO2 99%   BMI 28.36 kg/m²       Left Breast Wound: Clean, dry, and intact, no drainage. NAC with capillary refill intact. Appropriate level of edema and ecchymosis noted. flaps viable with good capillary refill at the area of trifurcation. Steri strips intact    Right Breast Wound: Clean, dry, and intact, no drainage. NAC with capillary refill intact. Appropriate level of edema and ecchymosis noted. flaps viable with good capillary refill at the area of trifurcation.   Steri strips intact      Assessment:    Patient Active Problem List   Diagnosis    Chronic back pain    Gastroesophageal reflux disease with esophagitis    FH: CAD (coronary artery disease)    FH: diabetes mellitus    FH: thyroid disease    Irritable bowel syndrome with both constipation and diarrhea    FH: colon polyps    FH: hyperlipidemia    Family history of early CAD    Macromastia    Chronic cervical pain    Cervical cancer, FIGO stage IA1 ([facility] [address]    COVID-19       Pathology report- [address]            [address]     [address]            [address] [facility]                                                   [address] [facility]   [place], [address], [address]                                                           [id number]               FINAL SURGICAL PATHOLOGY REPORT     NAME:            WAN SÁNCHEZ          Date of       06/01/2021                                            Collection:   Medical Record   [id number]              Date of       06/02/2021   Number:                                  Receipt:   Age:  52 Y        Sex:  F                Date          06/04/2021 12:21                                            Reported:   Date Of Birth:   1970   Financial        [id number]             Admitting     FÁTIMA GALLEGO   Number:                                  Physician:   Patient          WOLF TREVIZO         Ordering      FÁTIMA GALLEGO   Location:                                Physician:     Accession Number:  [id number]                                              Additional Physicians:MILLER COBIAN       Diagnosis:   A.  Breast, right, reduction mammoplasty: Benign breast tissue with   fibrocystic change; unremarkable skin     B.  Breast, left, reduction mammoplasty: Benign breast tissue with   fibrocystic change; unremarkable skin                                              Joce Veliz M.D.   Melia Schmitt                                   (Electronic Signature)     Plan:     Status post bilateral breast reduction    Continue with analgesia PRN   ABX sent to pharmacy today. Surgical Bra at all times with padding PRN for comfort  No driving while taking narcotic pain medication or while UE ROM is limited. OK to begin B/L UE ROM exercises  OK to shower at this time. Advised the patient that they can allow soap and water to rinse of the incision site while showering. Once they are done in the shower they are to pat dry the incision site with a clean paper towel. No baths, hot tubs or soaking of the wound site at this time. Pt voices understanding. F/U in 2 weeks. Call office with concerns or signs of infection.     Orman Seip, PA

## 2021-06-21 ENCOUNTER — OFFICE VISIT (OUTPATIENT)
Dept: SURGERY | Age: 51
End: 2021-06-21

## 2021-06-21 VITALS — BODY MASS INDEX: 28.32 KG/M2 | HEIGHT: 61 IN | TEMPERATURE: 97.5 F | WEIGHT: 150 LBS

## 2021-06-21 DIAGNOSIS — N62 MACROMASTIA: Primary | ICD-10-CM

## 2021-06-21 PROCEDURE — 99024 POSTOP FOLLOW-UP VISIT: CPT | Performed by: PHYSICIAN ASSISTANT

## 2021-06-21 NOTE — PROGRESS NOTES
Subjective: Follow up today from bilateral breast reduction. Denies fever, nausea, vomiting, leg pain or swelling, pain is absent. She presents today for continued wound examination. She states her only complaint is some tightness to the lateral inframammary fold scar lines which she understands is to be expected. She is hoping to return to work next week. Objective:    Temp 97.5 °F (36.4 °C) (Temporal)   Ht 5' 1\" (1.549 m)   Wt 150 lb (68 kg)   LMP 12/09/2020   BMI 28.34 kg/m²       Left Breast Wound: Clean, dry, and intact, no drainage. NAC with capillary refill intact. Appropriate level of edema and ecchymosis noted. flaps viable with good capillary refill at the area of trifurcation. Steri strips intact    Right Breast Wound: Clean, dry, and intact, no drainage. NAC with capillary refill intact. Appropriate level of edema and ecchymosis noted. flaps viable with good capillary refill at the area of trifurcation.   Steri strips intact      Assessment:    Patient Active Problem List   Diagnosis    Chronic back pain    Gastroesophageal reflux disease with esophagitis    FH: CAD (coronary artery disease)    FH: diabetes mellitus    FH: thyroid disease    Irritable bowel syndrome with both constipation and diarrhea    FH: colon polyps    FH: hyperlipidemia    Family history of early CAD    Macromastia    Chronic cervical pain    Cervical cancer, FIGO stage IA1 ([facility] [address]    COVID-19       Pathology report- [address]            [address]     [address]            [address]                                                   [address] [facility] [place], [address], [address] [place] [address]               FINAL SURGICAL PATHOLOGY REPORT     NAME:            PRINCESSWAN          Date of       06/01/2021                                            Collection:   Medical Record   [id number]              Date of       06/02/2021   Number:                                  Receipt:   Age:  52 Y        Sex:  F                Date          06/04/2021 12:21                                            Reported:   Date Of Birth:   1970   Financial        [id number]             Admitting     FÁTIMA GALLEGO   Number:                                  Physician:   Patient          WOLF TREVIZO         Ordering      FÁTIMA GALLEGO   Location:                                Physician:     Accession Number:  [id number]                                              Additional Physicians:MILLER COBIAN       Diagnosis:   A.  Breast, right, reduction mammoplasty: Benign breast tissue with   fibrocystic change; unremarkable skin     B.  Breast, left, reduction mammoplasty: Benign breast tissue with   fibrocystic change; unremarkable skin                                              DAVID Bob                                   (Electronic Signature)     Plan:       Status post bilateral breast reduction. I informed the patient today that they can continue with a sports bra at this time until the 3-month postop mimi where they can plan for fitting of a new bra. I explained to the patient that although their breasts have settled there may be continued swelling for several months. The patient may not see their final result of their breasts until upwards of 1 year postop. It is okay for the patient to continue with scar massage at this time as I have directed them. I informed the patient that they may have subtle changes to the breast as far as scar maturity and breast parenchyma changes. I advised the patient to bring any new masses or suspicious lesions to our office attention or their primary care physician as well as their OB/GYN.   They will need to continue with regularly scheduled mammograms as seen fit by their age. Scar Care Instructions      Sunscreen Recommendations for Scars   We recommend that all patients use a sunscreen when outside but especially on new scars (that are exposed to sunlight) for a year after their procedure.  The SPF should be at least 28. Follow the application directions on the bottle. Why is it so Important to Use Sunscreen on Scars?  A scar is new and more fragile than the surrounding skin.  If you do not use sunscreen, the scar line will react differently to the sun than the surrounding skin.  If you don't use sunscreen, the scar tissue will become darker than surrounding skin. This is a hyper-pigmented scar and will remain darker than the other skin.  After one year, the scar and surrounding skin should react equally to sun. Superficial Scar Massage   Scar massage desensitizes and reduces scar adhesions so skin glides freely.  Rub in a circular motion on and around the scar with firm, even pressure for 5 minutes four times per day    You can start scar massage once incision is completely healed and strong enough to handle the motion (usually 30 days post operatively).  You use lotion to do the scar massage to allow ease with motion over the scar and prevent friction at the area. Patient had no further questions. Okay to return to work next week with no restrictions. FU- PRN    Educate the patient contact office with any concerns or signs of infection moving forward.     SUSHIL Crews [place]

## 2021-08-16 ENCOUNTER — OFFICE VISIT (OUTPATIENT)
Dept: FAMILY MEDICINE CLINIC | Age: 51
End: 2021-08-16
Payer: COMMERCIAL

## 2021-08-16 VITALS
HEIGHT: 61 IN | WEIGHT: 151 LBS | SYSTOLIC BLOOD PRESSURE: 112 MMHG | RESPIRATION RATE: 18 BRPM | DIASTOLIC BLOOD PRESSURE: 74 MMHG | HEART RATE: 70 BPM | BODY MASS INDEX: 28.51 KG/M2 | TEMPERATURE: 97.6 F | OXYGEN SATURATION: 96 %

## 2021-08-16 DIAGNOSIS — Z71.89 EDUCATED ABOUT 2019-NCOV INFECTION: ICD-10-CM

## 2021-08-16 DIAGNOSIS — Z86.19 H/O COLD SORES: ICD-10-CM

## 2021-08-16 DIAGNOSIS — K21.9 GASTROESOPHAGEAL REFLUX DISEASE WITHOUT ESOPHAGITIS: Primary | ICD-10-CM

## 2021-08-16 DIAGNOSIS — Z83.3 FH: DIABETES MELLITUS: ICD-10-CM

## 2021-08-16 DIAGNOSIS — Z83.438 FH: HYPERLIPIDEMIA: ICD-10-CM

## 2021-08-16 DIAGNOSIS — Z12.11 ENCOUNTER FOR SCREENING COLONOSCOPY: ICD-10-CM

## 2021-08-16 PROBLEM — N62 MACROMASTIA: Status: RESOLVED | Noted: 2020-08-10 | Resolved: 2021-08-16

## 2021-08-16 PROBLEM — M54.2 CHRONIC CERVICAL PAIN: Status: RESOLVED | Noted: 2020-08-10 | Resolved: 2021-08-16

## 2021-08-16 PROBLEM — G89.29 CHRONIC CERVICAL PAIN: Status: RESOLVED | Noted: 2020-08-10 | Resolved: 2021-08-16

## 2021-08-16 PROCEDURE — 99213 OFFICE O/P EST LOW 20 MIN: CPT | Performed by: NURSE PRACTITIONER

## 2021-08-16 PROCEDURE — 1036F TOBACCO NON-USER: CPT | Performed by: NURSE PRACTITIONER

## 2021-08-16 PROCEDURE — G8427 DOCREV CUR MEDS BY ELIG CLIN: HCPCS | Performed by: NURSE PRACTITIONER

## 2021-08-16 PROCEDURE — G8419 CALC BMI OUT NRM PARAM NOF/U: HCPCS | Performed by: NURSE PRACTITIONER

## 2021-08-16 RX ORDER — VALACYCLOVIR HYDROCHLORIDE 500 MG/1
TABLET, FILM COATED ORAL
Qty: 20 TABLET | Refills: 0 | Status: SHIPPED | OUTPATIENT
Start: 2021-08-16

## 2021-08-16 RX ORDER — OMEPRAZOLE 20 MG/1
20 CAPSULE, DELAYED RELEASE ORAL EVERY OTHER DAY
Qty: 90 CAPSULE | Refills: 1 | Status: SHIPPED
Start: 2021-08-16 | End: 2022-03-28 | Stop reason: SDUPTHER

## 2021-08-16 SDOH — ECONOMIC STABILITY: FOOD INSECURITY: WITHIN THE PAST 12 MONTHS, THE FOOD YOU BOUGHT JUST DIDN'T LAST AND YOU DIDN'T HAVE MONEY TO GET MORE.: NEVER TRUE

## 2021-08-16 SDOH — ECONOMIC STABILITY: TRANSPORTATION INSECURITY
IN THE PAST 12 MONTHS, HAS THE LACK OF TRANSPORTATION KEPT YOU FROM MEDICAL APPOINTMENTS OR FROM GETTING MEDICATIONS?: NO

## 2021-08-16 SDOH — ECONOMIC STABILITY: FOOD INSECURITY: WITHIN THE PAST 12 MONTHS, YOU WORRIED THAT YOUR FOOD WOULD RUN OUT BEFORE YOU GOT MONEY TO BUY MORE.: NEVER TRUE

## 2021-08-16 SDOH — ECONOMIC STABILITY: INCOME INSECURITY: IN THE LAST 12 MONTHS, WAS THERE A TIME WHEN YOU WERE NOT ABLE TO PAY THE MORTGAGE OR RENT ON TIME?: NO

## 2021-08-16 SDOH — ECONOMIC STABILITY: TRANSPORTATION INSECURITY
IN THE PAST 12 MONTHS, HAS LACK OF TRANSPORTATION KEPT YOU FROM MEETINGS, WORK, OR FROM GETTING THINGS NEEDED FOR DAILY LIVING?: NO

## 2021-08-16 SDOH — ECONOMIC STABILITY: HOUSING INSECURITY
IN THE LAST 12 MONTHS, WAS THERE A TIME WHEN YOU DID NOT HAVE A STEADY PLACE TO SLEEP OR SLEPT IN A SHELTER (INCLUDING NOW)?: NO

## 2021-08-16 ASSESSMENT — ENCOUNTER SYMPTOMS
COUGH: 0
NAUSEA: 0
DIARRHEA: 0
BACK PAIN: 0
SHORTNESS OF BREATH: 0
SINUS PAIN: 0
COLOR CHANGE: 0
VOICE CHANGE: 0
TROUBLE SWALLOWING: 0
SORE THROAT: 0
RHINORRHEA: 0
ABDOMINAL PAIN: 0
CONSTIPATION: 0
FACIAL SWELLING: 0
SINUS PRESSURE: 0
VOMITING: 0
WHEEZING: 0
CHEST TIGHTNESS: 0

## 2021-08-16 ASSESSMENT — LIFESTYLE VARIABLES: HOW OFTEN DO YOU HAVE A DRINK CONTAINING ALCOHOL: NEVER

## 2021-08-16 ASSESSMENT — SOCIAL DETERMINANTS OF HEALTH (SDOH): HOW HARD IS IT FOR YOU TO PAY FOR THE VERY BASICS LIKE FOOD, HOUSING, MEDICAL CARE, AND HEATING?: NOT HARD AT ALL

## 2021-08-16 NOTE — PROGRESS NOTES
OFFICE PROGRESS NOTE  [address] [place] [address] Rd  [address] [address]  Dept: [phone]   Chief Complaint   Patient presents with    Gastroesophageal Reflux    Health Maintenance     due for colonoscopy, mammo done at [place], due for a1c       ASSESSMENT/PLAN   1. Gastroesophageal reflux disease without esophagitis  Assessment & Plan:   Well-controlled, continue current medications, continue current treatment plan, medication adherence emphasized and lifestyle modifications recommended  Orders:  -     CBC Auto Differential; Future  -     Comprehensive Metabolic Panel; Future  -     omeprazole (PRILOSEC) 20 MG delayed release capsule; Take 1 capsule by mouth every other day, Disp-90 capsule, R-1Normal  2. H/O cold sores  Assessment & Plan:  New Well-controlled, continue current medications and lifestyle modifications recommended RX valtrex 2000 mg every 12 hours x 1 day  Orders:  -     valACYclovir (VALTREX) 500 MG tablet; Take 2000 mg Q 12 x 1 day, Disp-20 tablet, R-0Normal  3. Encounter for screening colonoscopy  Assessment & Plan:   Referral to DR Lara, will need . Orders:  -     Amb External Referral To Gastroenterology  4. Educated about 2019-nCoV infection  Assessment & Plan:   Discussed getting covid vaccine in regards to pros and cons. Continue to social distance, face mask in public, wash hands frequently. 5. FH: diabetes mellitus  -     Hemoglobin A1C; Future  6. FH: hyperlipidemia  -     Lipid Panel; Future           Discussed weight loss, Discussed exercising 30 minutes daily and Discussed taking medications as directed and adverse effects    Return in about 6 months (around 2/16/2022) for GERD. HPI:   GERD: Kermit complains of heartburn if she forgets to take omeprazole the second day, she has been taking it every other day and does well. This has been associated with abdominal bloating and excessive gas.  Does drink pop and discussed today to try to get off the pop due to the carbonation will cause gas and bloating. She will start tracking and see what foods cause her problems. She denies belching, belching and eructation, bilious reflux, chest pain, choking on food, cough, deep pressure at base of neck, difficulty swallowing, dysphagia, early satiety, fullness after meals, hematemesis, hoarseness, laryngitis, melena, epigastric pain, nausea, need to clear throat frequently, nocturnal burning, odynophagia, regurgitation of undigested food, shortness of breath, symptoms primarily relate to meals, and lying down after meals, unexpected weight loss, upper abdominal discomfort, water brash and wheezing. Symptoms have been present for several years. She denies dysphagia. She has not lost weight. She denies melena, hematochezia, hematemesis, and coffee ground emesis. Medical therapy in the past has included proton pump inhibitors. She is due for colonoscopy and has seen Dr Medley Come in the past will refer back to him per patient request.     She has hx of herpes simplex and is asking if I can refill her valtrex as her gyn retired. Current Outpatient Medications:     valACYclovir (VALTREX) 500 MG tablet, Take 2000 mg Q 12 x 1 day, Disp: 20 tablet, Rfl: 0    omeprazole (PRILOSEC) 20 MG delayed release capsule, Take 1 capsule by mouth every other day, Disp: 90 capsule, Rfl: 1    Psyllium (METAMUCIL PO), Take by mouth daily , Disp: , Rfl:       Surgical History:  has a past surgical history that includes Tubal ligation (1999); Appendectomy (2009); Hemorrhoid surgery; LEEP (11/2020); tobin and bso (cervix removed) (12/29/2020); Hysterectomy; and Breast reduction surgery (Bilateral, 6/1/2021). Social History:  reports that she has never smoked. She has never used smokeless tobacco. She reports that she does not drink alcohol and does not use drugs.   Family History: family history includes Arthritis in her maternal grandmother; Breast Cancer (age of onset: 21) in her sister; COPD in her mother and sister; Cancer in her maternal grandfather and sister; Cancer (age of onset: 27) in her maternal uncle; Cancer (age of onset: 50) in her sister; Cancer (age of onset: 58) in her maternal aunt; Coronary Art Dis (age of onset: 28) in her brother; Dementia in her maternal grandmother; Diabetes in her sister; Early Death (age of onset: 28) in her brother; Heart Attack in her paternal grandmother; Heart Attack (age of onset: 27) in her father; Heart Disease in her brother and father; High Cholesterol in her sister; Kidney Disease in her maternal grandmother; Mental Illness in her paternal grandfather; No Known Problems in her sister and another family member; Other in her mother; Stroke in her paternal grandmother. I have reviewed Radha's allergies, medications, problem list, medical, social and family history and have updated as needed in the electronic medical record    Review of Systems   Constitutional: Negative for activity change, appetite change, chills, diaphoresis, fatigue, fever and unexpected weight change. HENT: Negative for congestion, dental problem, drooling, ear discharge, ear pain, facial swelling, hearing loss, mouth sores, nosebleeds, postnasal drip, rhinorrhea, sinus pressure, sinus pain, sneezing, sore throat, tinnitus, trouble swallowing and voice change. Eyes: Negative for visual disturbance. Respiratory: Negative for cough, chest tightness, shortness of breath and wheezing. Cardiovascular: Negative for chest pain, palpitations and leg swelling. Gastrointestinal: Negative for abdominal pain, constipation, diarrhea, nausea and vomiting. See HPI    Endocrine: Negative for cold intolerance, heat intolerance, polydipsia, polyphagia and polyuria. Genitourinary: Negative for difficulty urinating, frequency and urgency.    Musculoskeletal: Negative for arthralgias, back pain, gait problem, joint swelling, myalgias, neck pain and neck stiffness. Skin: Negative for color change, pallor, rash and wound. Allergic/Immunologic: Negative for environmental allergies, food allergies and immunocompromised state. Neurological: Negative for dizziness, tremors, seizures, syncope, facial asymmetry, speech difficulty, weakness, light-headedness, numbness and headaches. Hematological: Negative for adenopathy. Does not bruise/bleed easily. Psychiatric/Behavioral: Negative for agitation, behavioral problems, confusion, decreased concentration, dysphoric mood, hallucinations, self-injury, sleep disturbance and suicidal ideas. The patient is not nervous/anxious and is not hyperactive. OBJECTIVE:     VS:  Wt Readings from Last 3 Encounters:   08/16/21 151 lb (68.5 kg)   07/06/21 153 lb 6.4 oz (69.6 kg)   06/21/21 150 lb (68 kg)                       Vitals:    08/16/21 0718   BP: 112/74   Pulse: 70   Resp: 18   Temp: 97.6 °F (36.4 °C)   SpO2: 96%   Weight: 151 lb (68.5 kg)   Height: 5' 1\" (1.549 m)       General: Alert and oriented to person, place, and time, well developed and well nourished, in no acute distress  SKIN: Warm and dry, intact without any rash, masses or lesions  HEAD: normocephalic, atraumatic  Eyes: sclera/conjunctiva clear, PERRLA, EOMI's intact  Neck: supple and non-tender without mass, trachea midline, no cervical lymphadenopathy, no bruit, no thyromegaly or nodules  Cardiovascular: regular rate and regular rhythm, normal S1 and S2,  no murmurs, rubs, clicks, or gallop. Distal pulses intact, no carotid bruits. No edema  Pulmonary/Chest: clear to auscultation bilaterally, no wheezes, rales or rhonchi, normal air movement, no respiratory distress  Abdomen: soft, non-tender, non-distended, normal bowel sounds, no masses or hepatosplenomegaly  Neurologic:  gait, coordination and speech normal  Extremities: no clubbing, cyanosis, or edema.    Psychiatric: Good eye contact, normal mood and affect, answers questions appropriately    I have reviewed my findings and recommendations with Radha Salter.     Bernard Snow, TALI - CNP, NP-C, FNP-BC

## 2021-08-16 NOTE — ASSESSMENT & PLAN NOTE
Discussed getting covid vaccine in regards to pros and cons. Continue to social distance, face mask in public, wash hands frequently.

## 2021-08-16 NOTE — ASSESSMENT & PLAN NOTE
New Well-controlled, continue current medications and lifestyle modifications recommended RX valtrex 2000 mg every 12 hours x 1 day

## 2021-08-16 NOTE — PATIENT INSTRUCTIONS
The medication list included in this document is our record of what you are currently taking, including any changes that were made at today's visit.  If you find any differences when compared to your medications at home, or have any questions that were not answered at your visit, please contact the office. Patient Education        Colon Cancer Screening: Care Instructions  Overview     Colorectal cancer occurs in the colon or rectum. That's the lower part of your digestive system. It often starts in small growths called polyps in the colon or rectum. Polyps are usually found with screening tests. Depending on the type of test, any polyps found may be removed during the tests. Colorectal cancer usually does not cause symptoms at first. But regular tests can help find it early, before it spreads and becomes harder to treat. Your risk for colorectal cancer gets higher as you get older. Some experts say that adults should start regular screening at age 48 and stop at age 76. Others say to start before age 48 or continue after age 76. Talk with your doctor about your risk and when to start and stop screening. You may have one of several tests. Follow-up care is a key part of your treatment and safety. Be sure to make and go to all appointments, and call your doctor if you are having problems. It's also a good idea to know your test results and keep a list of the medicines you take. What are the main screening tests for colon cancer? The screening tests are:  Stool tests. These include the guaiac fecal occult blood test (gFOBT), the fecal immunochemical test (FIT), and the combined fecal immunochemical test and stool DNA test (FIT-DNA). These tests check stool samples for signs of cancer. If your test is positive, you will need to have a colonoscopy. Sigmoidoscopy. This test lets your doctor look at the lining of your rectum and the lowest part of your colon.  Your doctor uses a lighted tube called a sigmoidoscope. This test can't find cancers or polyps in the upper part of your colon. In some cases, polyps that are found can be removed. But if your doctor finds polyps, you will need to have a colonoscopy to check the upper part of your colon. Colonoscopy. This test lets your doctor look at the lining of your rectum and your entire colon. The doctor uses a thin, flexible tool called a colonoscope. It can also be used to remove polyps or get a tissue sample (biopsy). A less common test is CT colonography (CTC). It's also called virtual colonoscopy. Who should be screened for colorectal cancer? Your risk for colorectal cancer gets higher as you get older. Some experts say that adults should start regular screening at age 48 and stop at age 76. Others say to start before age 48 or continue after age 76. Talk with your doctor about your risk and when to start and stop screening. How often you need screening depends on the type of test you get:  Stool tests. Every 1 or 2 years for FIT or gFOBT. Every 3 years for sDNA, also called FIT-DNA. Tests that look inside the colon. Every 5 or 10 years for sigmoidoscopy. Every 5 years for CT colonography (virtual colonoscopy). Every 10 years for colonoscopy. Experts agree that people at higher risk may need to be tested sooner and more often. This includes people who have a strong family history of colon cancer. Talk to your doctor about which test is best for you and when to be tested. When should you call for help? Watch closely for changes in your health, and be sure to contact your doctor if:    · You have any changes in your bowel habits.     · You have any problems. Where can you learn more? Go to https://alexandro.[employer]. org and sign in to your [employer] account. Enter [phone] in the [employer] box to learn more about \"Colon Cancer Screening: Care Instructions. \"     If you do not have an account, please click on the \"Sign Up Now\" link. Current as of: December 17, 2020               Content Version: 12.9  © 7446-4340 [employer]. Care instructions adapted under license by [facility] [address] If you have questions about a medical condition or this instruction, always ask your healthcare professional. [address] any warranty or liability for your use of this information. Patient Education        valacyclovir  Pronunciation:  edwin lyonnasrin lacho  Brand:  Valtrex  What is the most important information I should know about valacyclovir? Follow all directions on your medicine label and package. Tell each of your healthcare providers about all your medical conditions, allergies, and all medicines you use. What is valacyclovir? Valacyclovir is an antiviral medicine that is used to treat or prevent herpes virus episodes, including genital herpes or shingles (herpes zoster) in adults. Valacyclovir is used to reduce the chance that an adult with genital herpes will pass this virus to an uninfected sex partner. Valacyclovir is also used to treat cold sores in adults and children at least 15years old, or chickenpox in children 3to 25years old. There is no cure for herpes and valacyclovir will not prevent you from developing symptoms in the future. If you have genital herpes, your sex partner may be infected even if they have no symptoms. A medical test is the only way to accurately diagnose genital herpes. Valacyclovir may also be used for purposes not listed in this medication guide. What should I discuss with my healthcare provider before taking valacyclovir? You should not use this medicine if you are allergic to valacyclovir or acyclovir (Zovirax). Tell your doctor if you have ever had:  · kidney disease (or if you are on dialysis);  · a weak immune system;  · HIV or AIDS; or  · a kidney transplant or bone marrow transplant. Tell your doctor if you are pregnant or plan to become pregnant. Herpes can be passed to your baby during childbirth if you have a genital lesion when your baby is born. If you have genital herpes, it is very important to prevent herpes lesions during pregnancy. Take your medicine as directed to best control your infection. It may not be safe to breastfeed while using this medicine. Ask your doctor about any risk. Do not give this medicine to a child without medical advice. How should I take valacyclovir? Follow all directions on your prescription label and read all medication guides or instruction sheets. Your doctor may occasionally change your dose. Use the medicine exactly as directed. Start taking valacyclovir as soon as possible after the first appearance of symptoms (such as tingling, burning, blisters, or rash). This medicine may not be effective if taken after you already have symptoms. You may take valacyclovir with or without food. Tell your doctor if a child taking this medicine cannot swallow the valacyclovir tablet. Your pharmacist may prepare an oral suspension (liquid) form of valacyclovir. Shake this suspension before you measure a dose. Use the dosing syringe provided, or use a medicine dose-measuring device (not a kitchen spoon). Lesions caused by herpes viruses should be kept as clean and dry as possible. Wearing loose clothing may help to prevent irritation of the lesions. Some herpes infections need to be treated for longer than others. You may need to take the medicine for only 1 day or for just a few days. To prevent recurrent genital herpes, you may need to take valacyclovir for up to 1 year. Use this medicine for the full prescribed length of time, even if your symptoms quickly improve. Follow your doctor's dosing instructions very carefully. Taking more valacyclovir than prescribed will not make this medicine more effective. Drink plenty of water while you are taking valacyclovir to keep your kidneys working properly.   Store at room temperature away from moisture and heat. Keep the bottle tightly closed when not in use. Store the oral suspension in a refrigerator and throw away suspension any left over after 28 days of use. What happens if I miss a dose? Take the medicine as soon as you can, but skip the missed dose if it is almost time for your next dose. Do not take two doses at one time. What happens if I overdose? Seek emergency medical attention or call the Poison Help line at 1-720.824.8633. What should I avoid while taking valacyclovir? Avoid sexual intercourse or use a latex condom to help keep you from spreading the herpes virus to others. Avoid letting infected areas come into contact with other people. Avoid touching an infected area and then touching your eyes. Wash your hands frequently to prevent the spread of infection. Valacyclovir will not prevent the spread of sexually transmitted diseases other than the virus that causes genital herpes. Do not share this medicine with another person, even if they have the same symptoms you have. What are the possible side effects of valacyclovir? Get emergency medical help if you have signs of an allergic reaction: hives; difficult breathing; swelling of your face, lips, tongue, or throat. Call your doctor at once if you have:  · confusion, agitation, or you feel shaky or unsteady;  · hallucinations (seeing or hearing things that are not real);  · problems with speech;  · a seizure; or  · kidney problems --little or no urination, swelling in your feet or ankles, feeling tired or short of breath. Serious side effects may be more likely in adults who are 72 or older.   Stop taking valacyclovir and call your doctor right away if you have any of the following signs of a serious side effect that can harm red blood cells:   · fever, pale skin;  · unusual bleeding (nosebleeds, bleeding gums);  · red or pink urine, little or no urination;  · purple or red spots on the skin (not related to herpes or chickenpox);  · feeling weak or tired;  · stomach pain, bloody diarrhea, vomiting; or  · swelling in your face, hands, or feet. Common side effects may include:  · nausea, stomach pain; or  · headache. This is not a complete list of side effects and others may occur. Call your doctor for medical advice about side effects. You may report side effects to FDA at 9-459-DYC-9019. What other drugs will affect valacyclovir? Valacyclovir can harm your kidneys, especially if you also use certain medicines for infections, cancer, osteoporosis, organ transplant rejection, bowel disorders, high blood pressure, or pain or arthritis (including Advil, Motrin, and Aleve). Other drugs may affect valacyclovir, including prescription and over-the-counter medicines, vitamins, and herbal products. Tell your doctor about all your current medicines and any medicine you start or stop using. Where can I get more information? Your pharmacist can provide more information about valacyclovir. Remember, keep this and all other medicines out of the reach of children, never share your medicines with others, and use this medication only for the indication prescribed. Every effort has been made to ensure that the information provided by [address] is accurate, up-to-date, and complete, but no guarantee is made to that effect. Drug information contained herein may be time sensitive. [employer] information has been compiled for use by healthcare practitioners and consumers in the [place] and therefore [employer] does not warrant that uses outside of the [place] are appropriate, unless specifically indicated otherwise. [employer] drug information does not endorse drugs, diagnose patients or recommend therapy.  [employer] drug information is an informational resource designed to assist licensed healthcare practitioners in caring for their patients and/or to serve consumers viewing this service as a supplement to, and not a substitute for, the expertise, skill, knowledge and judgment of healthcare practitioners. The absence of a warning for a given drug or drug combination in no way should be construed to indicate that the drug or drug combination is safe, effective or appropriate for any given patient. [facility] does not assume any responsibility for any aspect of healthcare administered with the aid of information [facility] provides. The information contained herein is not intended to cover all possible uses, directions, precautions, warnings, drug interactions, allergic reactions, or adverse effects. If you have questions about the drugs you are taking, check with your doctor, nurse or pharmacist.  Copyright 0401-2173 [address]: 12.02. Revision date: 2/26/2021. Care instructions adapted under license by [facility] ([facility]). If you have questions about a medical condition or this instruction, always ask your healthcare professional. [place] [address] any warranty or liability for your use of this information.

## 2021-08-17 ENCOUNTER — PATIENT MESSAGE (OUTPATIENT)
Dept: FAMILY MEDICINE CLINIC | Age: 51
End: 2021-08-17

## 2021-09-15 PROBLEM — Z12.11 ENCOUNTER FOR SCREENING COLONOSCOPY: Status: RESOLVED | Noted: 2021-08-16 | Resolved: 2021-09-15

## 2021-11-12 PROBLEM — Z98.890 HX OF COLONOSCOPY: Status: ACTIVE | Noted: 2021-11-12

## 2022-01-11 ENCOUNTER — APPOINTMENT (OUTPATIENT)
Dept: GENERAL RADIOLOGY | Age: 52
End: 2022-01-11
Payer: COMMERCIAL

## 2022-01-11 ENCOUNTER — HOSPITAL ENCOUNTER (EMERGENCY)
Age: 52
Discharge: HOME | End: 2022-01-11
Payer: COMMERCIAL

## 2022-01-11 VITALS
HEIGHT: 61 IN | BODY MASS INDEX: 29.27 KG/M2 | WEIGHT: 155 LBS | DIASTOLIC BLOOD PRESSURE: 90 MMHG | SYSTOLIC BLOOD PRESSURE: 143 MMHG | RESPIRATION RATE: 20 BRPM | OXYGEN SATURATION: 100 % | TEMPERATURE: 97.5 F | HEART RATE: 84 BPM

## 2022-01-11 DIAGNOSIS — S83.92XA SPRAIN OF LEFT KNEE, UNSPECIFIED LIGAMENT, INITIAL ENCOUNTER: Primary | ICD-10-CM

## 2022-01-11 PROCEDURE — 99211 OFF/OP EST MAY X REQ PHY/QHP: CPT

## 2022-01-11 PROCEDURE — 73560 X-RAY EXAM OF KNEE 1 OR 2: CPT

## 2022-01-11 NOTE — ED PROVIDER NOTES
[address] [facility]  Department of Emergency Medicine  UC Encounter Note  22   5:57 PM EST      NAME: Megan Boeck  :    MRN:  [id number]    Chief Complaint: Knee Injury ( 1115am slipped on ice while getting out of truck  caught my self was ok at first and then pain left outer aspect with walking no pain with rest )      This is a 53-year-old female who presents to urgent care complaining of left lateral knee pain. She states at work today she was getting out of her truck and she slipped on some ice but she caught her self and was okay at first but then noticed that the left outer aspect of her knee was painful with ambulation. She denies any pain with no movement. She denies any ankle or hip pain. No head or neck pain. On first contact with patient she appears to be no acute distress. Review of Systems  Pertinent positives and negatives are stated within HPI, all other systems reviewed and are negative. Physical Exam  Vitals and nursing note reviewed. Constitutional:       Appearance: She is well-developed. HENT:      Head: Normocephalic and atraumatic. Right Ear: Hearing and external ear normal.      Left Ear: Hearing and external ear normal.      Nose: Nose normal.      Mouth/Throat:      Pharynx: Uvula midline. Eyes:      General: Lids are normal.      Conjunctiva/sclera: Conjunctivae normal.      Pupils: Pupils are equal, round, and reactive to light. Cardiovascular:      Rate and Rhythm: Normal rate and regular rhythm. Heart sounds: Normal heart sounds. No murmur heard. Pulmonary:      Effort: Pulmonary effort is normal.      Breath sounds: Normal breath sounds. Abdominal:      General: Bowel sounds are normal.      Palpations: Abdomen is soft. Abdomen is not rigid. Tenderness: There is no abdominal tenderness. There is no guarding or rebound. Musculoskeletal:      Cervical back: Normal range of motion and neck supple. Left hip: Normal.      Left knee: Bony tenderness present. Tenderness present over the lateral joint line. No LCL laxity, MCL laxity, ACL laxity or PCL laxity. Normal pulse. Left ankle: Normal.      Left Achilles Tendon: Normal.   Skin:     General: Skin is warm and dry. Findings: No abrasion or rash. Neurological:      General: No focal deficit present. Mental Status: She is alert and oriented to person, place, and time. GCS: GCS eye subscore is 4. GCS verbal subscore is 5. GCS motor subscore is 6. Cranial Nerves: No cranial nerve deficit. Sensory: No sensory deficit. Coordination: Coordination normal.      Gait: Gait normal.         Procedures    MDM         --------------------------------------------- PAST HISTORY ---------------------------------------------  Past Medical History:  has a past medical history of Abdominal bloating, ASCUS with positive high risk HPV, Cervical cancer (HCC), Chronic back pain, Chronic cervical pain, COVID-19, Early satiety, GERD (gastroesophageal reflux disease), Hx of colonoscopy, Macromastia, and Other constipation. Past Surgical History:  has a past surgical history that includes Tubal ligation (1999); Appendectomy (2009); Hemorrhoid surgery; LEEP (11/2020); tobin and bso (cervix removed) (12/29/2020); Hysterectomy; and Breast reduction surgery (Bilateral, 6/1/2021). Social History:  reports that she has never smoked. She has never used smokeless tobacco. She reports that she does not drink alcohol and does not use drugs. Family History: family history includes Arthritis in her maternal grandmother; Breast Cancer (age of onset: 21) in her sister; COPD in her mother and sister; Cancer in her maternal grandfather and sister; Cancer (age of onset: 27) in her maternal uncle;  Cancer (age of onset: 50) in her sister; Cancer (age of onset: 58) in her maternal aunt; Coronary Art Dis (age of onset: 28) in her brother; Dementia in her maternal grandmother; Diabetes in her sister; Early Death (age of onset: 28) in her brother; Heart Attack in her paternal grandmother; Heart Attack (age of onset: 27) in her father; Heart Disease in her brother and father; High Cholesterol in her sister; Kidney Disease in her maternal grandmother; Mental Illness in her paternal grandfather; No Known Problems in her sister and another family member; Other in her mother; Stroke in her paternal grandmother. The patients home medications have been reviewed. Allergies: Patient has no known allergies. -------------------------------------------------- RESULTS -------------------------------------------------  No results found for this visit on 01/11/22. XR KNEE LEFT (1-2 VIEWS)   Final Result   Unremarkable left knee.             ------------------------- NURSING NOTES AND VITALS REVIEWED ---------------------------   The nursing notes within the ED encounter and vital signs as below have been reviewed. BP (!) 143/90   Pulse 84   Temp 97.5 °F (36.4 °C) (Skin)   Ht 5' 1\" (1.549 m)   Wt 155 lb (70.3 kg)   LMP 12/09/2020   SpO2 100%   BMI 29.29 kg/m²   Oxygen Saturation Interpretation: Normal      ------------------------------------------ PROGRESS NOTES ------------------------------------------   I have spoken with the patient and discussed todays results, in addition to providing specific details for the plan of care and counseling regarding the diagnosis and prognosis. Their questions are answered at this time and they are agreeable with the plan.      --------------------------------- ADDITIONAL PROVIDER NOTES ---------------------------------     This patient is stable for discharge. I have shared the specific conditions for return, as well as the importance of follow-up. * NOTE: This report was transcribed using voice recognition software.  Every effort was made to ensure accuracy; however, inadvertent computerized transcription errors may be present.    --------------------------------- IMPRESSION AND DISPOSITION ---------------------------------    IMPRESSION  1.  Sprain of left knee, unspecified ligament, initial encounter        DISPOSITION  Disposition: Discharge to home  Patient condition is good       Cari Josue PA-C  01/11/22 1933

## 2022-02-13 ENCOUNTER — HOSPITAL ENCOUNTER (OUTPATIENT)
Dept: MRI IMAGING | Age: 52
Discharge: HOME | End: 2022-02-15
Payer: COMMERCIAL

## 2022-02-13 DIAGNOSIS — S83.92XA SPRAIN OF LEFT KNEE, UNSPECIFIED LIGAMENT, INITIAL ENCOUNTER: ICD-10-CM

## 2022-02-13 PROCEDURE — 73721 MRI JNT OF LWR EXTRE W/O DYE: CPT

## 2022-03-28 ENCOUNTER — OFFICE VISIT (OUTPATIENT)
Dept: FAMILY MEDICINE CLINIC | Age: 52
End: 2022-03-28
Payer: COMMERCIAL

## 2022-03-28 VITALS
OXYGEN SATURATION: 98 % | HEART RATE: 83 BPM | WEIGHT: 157 LBS | HEIGHT: 61 IN | SYSTOLIC BLOOD PRESSURE: 118 MMHG | BODY MASS INDEX: 29.64 KG/M2 | DIASTOLIC BLOOD PRESSURE: 78 MMHG | TEMPERATURE: 97.2 F

## 2022-03-28 DIAGNOSIS — Z12.31 SCREENING MAMMOGRAM FOR BREAST CANCER: ICD-10-CM

## 2022-03-28 DIAGNOSIS — K21.9 GASTROESOPHAGEAL REFLUX DISEASE WITHOUT ESOPHAGITIS: Primary | ICD-10-CM

## 2022-03-28 DIAGNOSIS — Z01.419 ENCOUNTER FOR GYNECOLOGICAL EXAMINATION: ICD-10-CM

## 2022-03-28 PROCEDURE — 1036F TOBACCO NON-USER: CPT | Performed by: NURSE PRACTITIONER

## 2022-03-28 PROCEDURE — 99213 OFFICE O/P EST LOW 20 MIN: CPT | Performed by: NURSE PRACTITIONER

## 2022-03-28 PROCEDURE — G8419 CALC BMI OUT NRM PARAM NOF/U: HCPCS | Performed by: NURSE PRACTITIONER

## 2022-03-28 PROCEDURE — G8484 FLU IMMUNIZE NO ADMIN: HCPCS | Performed by: NURSE PRACTITIONER

## 2022-03-28 PROCEDURE — G8427 DOCREV CUR MEDS BY ELIG CLIN: HCPCS | Performed by: NURSE PRACTITIONER

## 2022-03-28 RX ORDER — OMEPRAZOLE 20 MG/1
20 CAPSULE, DELAYED RELEASE ORAL DAILY
Qty: 90 CAPSULE | Refills: 1 | Status: SHIPPED
Start: 2022-03-28 | End: 2022-10-03 | Stop reason: SDUPTHER

## 2022-03-28 RX ORDER — VALACYCLOVIR HYDROCHLORIDE 1 G/1
TABLET, FILM COATED ORAL
COMMUNITY
Start: 2022-03-17

## 2022-03-28 ASSESSMENT — PATIENT HEALTH QUESTIONNAIRE - PHQ9
SUM OF ALL RESPONSES TO PHQ QUESTIONS 1-9: 0
SUM OF ALL RESPONSES TO PHQ QUESTIONS 1-9: 0
1. LITTLE INTEREST OR PLEASURE IN DOING THINGS: 0
SUM OF ALL RESPONSES TO PHQ QUESTIONS 1-9: 0
SUM OF ALL RESPONSES TO PHQ9 QUESTIONS 1 & 2: 0
SUM OF ALL RESPONSES TO PHQ QUESTIONS 1-9: 0
2. FEELING DOWN, DEPRESSED OR HOPELESS: 0

## 2022-03-28 ASSESSMENT — ENCOUNTER SYMPTOMS
WHEEZING: 0
ABDOMINAL PAIN: 0
DIARRHEA: 0
VOMITING: 0
NAUSEA: 0
SHORTNESS OF BREATH: 0
COUGH: 0
CONSTIPATION: 0

## 2022-03-28 NOTE — PROGRESS NOTES
OFFICE PROGRESS NOTE  [address] [place] [address] Rd  [address] [address]  Dept: [phone]   Chief Complaint   Patient presents with    Gastroesophageal Reflux     6 mo F/U       ASSESSMENT/PLAN   1. Gastroesophageal reflux disease without esophagitis  Assessment & Plan:  Borderline controlled resume Gaviscon in am 30 - 60 minutes before breakfast and take the omeprazole 30 - 60 minute before dinner may need to increase to 40 mg    -Discussed elevated the HOB 30 degrees  -Discussed limiting spicy, fatty, greasy foods  -Discussed limit caffeine consumption to 1 - 2 cups daily  -Discussed waiting at least 3 - 4 hours before going to bed after eating  -Discussed not to eat and bend over immediately or lift heavy items.  -Discussed weight reduction if needed even 5 - 10 pounds.  -Discussed taking medications 1 hour prior to eating. Orders:  -     omeprazole (PRILOSEC) 20 MG delayed release capsule; Take 1 capsule by mouth Daily, Disp-90 capsule, R-1Normal  2. Screening mammogram for breast cancer  -     IKER DIGITAL SCREEN W OR WO CAD BILATERAL; Future  3. Encounter for gynecological examination  -     Gwyn Vizcarra MD, OB/GYN, [facility] ([facility])           Discussed weight loss, Discussed exercising 30 minutes daily and Discussed taking medications as directed and adverse effects    Return in about 6 months (around 9/28/2022) for GERD. HPI:   GERD: Patient complains of heartburn occasional she has been working on cutting back on the omeprazole to every other day. This has been associated with abdominal bloating and heartburn. She denies no other symptoms. Symptoms have been present for several years. She saw Dr Alysha Leyva had EGD done and found to have small sliding hiatal hernia and GERD with aerophagia. She denies dysphagia. She has not lost weight. She denies melena, hematochezia, hematemesis, and coffee ground emesis.  Medical therapy in the past has included proton pump inhibitors and Gaviscon as needed. Kory Case She would [facility] and [facility]        Current Outpatient Medications:     valACYclovir (VALTREX) 1 g tablet, TAKE 1 TABLET BY MOUTH EVERY DAY FOR 3 DAYS, Disp: , Rfl:     omeprazole (PRILOSEC) 20 MG delayed release capsule, Take 1 capsule by mouth Daily, Disp: 90 capsule, Rfl: 1    valACYclovir (VALTREX) 500 MG tablet, Take 2000 mg Q 12 x 1 day, Disp: 20 tablet, Rfl: 0      Surgical History:  has a past surgical history that includes Tubal ligation (1999); Appendectomy (2009); Hemorrhoid surgery; LEEP (11/2020); tobin and bso (cervix removed) (12/29/2020); Hysterectomy; and Breast reduction surgery (Bilateral, 6/1/2021). Social History:  reports that she has never smoked. She has never used smokeless tobacco. She reports that she does not drink alcohol and does not use drugs. Family History: family history includes Arthritis in her maternal grandmother; Breast Cancer (age of onset: 21) in her sister; COPD in her mother and sister; Cancer in her maternal grandfather and sister; Cancer (age of onset: 27) in her maternal uncle; Cancer (age of onset: 50) in her sister; Cancer (age of onset: 58) in her maternal aunt; Coronary Art Dis (age of onset: 28) in her brother; Dementia in her maternal grandmother; Diabetes in her sister; Early Death (age of onset: 28) in her brother; Heart Attack in her paternal grandmother; Heart Attack (age of onset: 27) in her father; Heart Disease in her brother and father; High Cholesterol in her sister; Kidney Disease in her maternal grandmother; Mental Illness in her paternal grandfather; No Known Problems in her sister and another family member; Other in her mother; Stroke in her paternal grandmother.   I have reviewed Radha's allergies, medications, problem list, medical, social and family history and have updated as needed in the electronic medical record    Review of Systems   Constitutional: Negative for activity change, appetite change, chills, diaphoresis, fatigue, fever and unexpected weight change. Respiratory: Negative for cough, shortness of breath and wheezing. Cardiovascular: Negative for chest pain, palpitations and leg swelling. Gastrointestinal: Negative for abdominal pain, constipation, diarrhea, nausea and vomiting. Heart burn, bloating. OBJECTIVE:     VS:  Wt Readings from Last 3 Encounters:   03/28/22 157 lb (71.2 kg)   01/11/22 155 lb (70.3 kg)   08/16/21 151 lb (68.5 kg)                       Vitals:    03/28/22 1501   BP: 118/78   Pulse: 83   Temp: 97.2 °F (36.2 °C)   TempSrc: Infrared   SpO2: 98%   Weight: 157 lb (71.2 kg)   Height: 5' 1\" (1.549 m)       General: Alert and oriented to person, place, and time, well developed and well nourished, in no acute distress  SKIN: Warm and dry, intact without any rash, masses or lesions  HEAD: normocephalic, atraumatic  Neck: supple and non-tender without mass, trachea midline, no cervical lymphadenopathy, no bruit, no thyromegaly or nodules  Cardiovascular: regular rate and regular rhythm, normal S1 and S2,  no murmurs, rubs, clicks, or gallop. Distal pulses intact, no carotid bruits. No edema  Pulmonary/Chest: clear to auscultation bilaterally, no wheezes, rales or rhonchi, normal air movement, no respiratory distress  Abdomen: soft, non-tender, non-distended, normal bowel sounds, no masses or hepatosplenomegaly  Neurologic:  gait, coordination and speech normal  Extremities: no clubbing, cyanosis, or edema. Psychiatric: Good eye contact, normal mood and affect, answers questions appropriately    I have reviewed my findings and recommendations with Radha Salter.     Diamante Adams, APRN - CNP, NP-C, FNP-BC

## 2022-03-28 NOTE — ASSESSMENT & PLAN NOTE
Borderline controlled resume Gaviscon in am 30 - 60 minutes before breakfast and take the omeprazole 30 - 60 minute before dinner may need to increase to 40 mg    -Discussed elevated the HOB 30 degrees  -Discussed limiting spicy, fatty, greasy foods  -Discussed limit caffeine consumption to 1 - 2 cups daily  -Discussed waiting at least 3 - 4 hours before going to bed after eating  -Discussed not to eat and bend over immediately or lift heavy items.  -Discussed weight reduction if needed even 5 - 10 pounds.  -Discussed taking medications 1 hour prior to eating.

## 2022-10-03 ENCOUNTER — OFFICE VISIT (OUTPATIENT)
Dept: FAMILY MEDICINE CLINIC | Age: 52
End: 2022-10-03
Payer: COMMERCIAL

## 2022-10-03 VITALS
HEIGHT: 61 IN | OXYGEN SATURATION: 96 % | WEIGHT: 152.6 LBS | SYSTOLIC BLOOD PRESSURE: 116 MMHG | RESPIRATION RATE: 16 BRPM | HEART RATE: 67 BPM | BODY MASS INDEX: 28.81 KG/M2 | DIASTOLIC BLOOD PRESSURE: 78 MMHG | TEMPERATURE: 97.6 F

## 2022-10-03 DIAGNOSIS — Z28.21 INFLUENZA VACCINATION DECLINED BY PATIENT: ICD-10-CM

## 2022-10-03 DIAGNOSIS — M18.12 DEGENERATIVE ARTHRITIS OF THUMB, LEFT: ICD-10-CM

## 2022-10-03 DIAGNOSIS — Z83.3 FH: DIABETES MELLITUS: ICD-10-CM

## 2022-10-03 DIAGNOSIS — K21.9 GASTROESOPHAGEAL REFLUX DISEASE WITHOUT ESOPHAGITIS: ICD-10-CM

## 2022-10-03 DIAGNOSIS — Z83.438 FH: HYPERLIPIDEMIA: ICD-10-CM

## 2022-10-03 DIAGNOSIS — K21.9 GASTROESOPHAGEAL REFLUX DISEASE WITHOUT ESOPHAGITIS: Primary | ICD-10-CM

## 2022-10-03 LAB
ALBUMIN SERPL-MCNC: 4.3 G/DL (ref 3.5–5.2)
ALP BLD-CCNC: 73 U/L (ref 35–104)
ALT SERPL-CCNC: 13 U/L (ref 0–32)
ANION GAP SERPL CALCULATED.3IONS-SCNC: 9 MMOL/L (ref 7–16)
AST SERPL-CCNC: 16 U/L (ref 0–31)
BASOPHILS ABSOLUTE: 0.02 E9/L (ref 0–0.2)
BASOPHILS RELATIVE PERCENT: 0.3 % (ref 0–2)
BILIRUB SERPL-MCNC: 0.5 MG/DL (ref 0–1.2)
BUN BLDV-MCNC: 18 MG/DL (ref 6–20)
CALCIUM SERPL-MCNC: 9.3 MG/DL (ref 8.6–10.2)
CHLORIDE BLD-SCNC: 103 MMOL/L (ref 98–107)
CHOLESTEROL, TOTAL: 170 MG/DL (ref 0–199)
CO2: 26 MMOL/L (ref 22–29)
CREAT SERPL-MCNC: 0.9 MG/DL (ref 0.5–1)
EOSINOPHILS ABSOLUTE: 0.18 E9/L (ref 0.05–0.5)
EOSINOPHILS RELATIVE PERCENT: 2.8 % (ref 0–6)
GFR AFRICAN AMERICAN: >60
GFR NON-AFRICAN AMERICAN: >60 ML/MIN/1.73
GLUCOSE BLD-MCNC: 94 MG/DL (ref 74–99)
HBA1C MFR BLD: 5.6 % (ref 4–5.6)
HCT VFR BLD CALC: 43.7 % (ref 34–48)
HDLC SERPL-MCNC: 50 MG/DL
HEMOGLOBIN: 14.1 G/DL (ref 11.5–15.5)
IMMATURE GRANULOCYTES #: 0.01 E9/L
IMMATURE GRANULOCYTES %: 0.2 % (ref 0–5)
LDL CHOLESTEROL CALCULATED: 103 MG/DL (ref 0–99)
LYMPHOCYTES ABSOLUTE: 3.4 E9/L (ref 1.5–4)
LYMPHOCYTES RELATIVE PERCENT: 53.8 % (ref 20–42)
MCH RBC QN AUTO: 30.7 PG (ref 26–35)
MCHC RBC AUTO-ENTMCNC: 32.3 % (ref 32–34.5)
MCV RBC AUTO: 95.2 FL (ref 80–99.9)
MONOCYTES ABSOLUTE: 0.39 E9/L (ref 0.1–0.95)
MONOCYTES RELATIVE PERCENT: 6.2 % (ref 2–12)
NEUTROPHILS ABSOLUTE: 2.32 E9/L (ref 1.8–7.3)
NEUTROPHILS RELATIVE PERCENT: 36.7 % (ref 43–80)
PDW BLD-RTO: 12.4 FL (ref 11.5–15)
PLATELET # BLD: 287 E9/L (ref 130–450)
PMV BLD AUTO: 11.4 FL (ref 7–12)
POTASSIUM SERPL-SCNC: 4.5 MMOL/L (ref 3.5–5)
RBC # BLD: 4.59 E12/L (ref 3.5–5.5)
SODIUM BLD-SCNC: 138 MMOL/L (ref 132–146)
TOTAL PROTEIN: 7.3 G/DL (ref 6.4–8.3)
TRIGL SERPL-MCNC: 83 MG/DL (ref 0–149)
VLDLC SERPL CALC-MCNC: 17 MG/DL
WBC # BLD: 6.3 E9/L (ref 4.5–11.5)

## 2022-10-03 PROCEDURE — 99214 OFFICE O/P EST MOD 30 MIN: CPT | Performed by: NURSE PRACTITIONER

## 2022-10-03 PROCEDURE — G8419 CALC BMI OUT NRM PARAM NOF/U: HCPCS | Performed by: NURSE PRACTITIONER

## 2022-10-03 PROCEDURE — G8484 FLU IMMUNIZE NO ADMIN: HCPCS | Performed by: NURSE PRACTITIONER

## 2022-10-03 PROCEDURE — 1036F TOBACCO NON-USER: CPT | Performed by: NURSE PRACTITIONER

## 2022-10-03 PROCEDURE — G8427 DOCREV CUR MEDS BY ELIG CLIN: HCPCS | Performed by: NURSE PRACTITIONER

## 2022-10-03 RX ORDER — OMEPRAZOLE 20 MG/1
20 CAPSULE, DELAYED RELEASE ORAL DAILY
Qty: 90 CAPSULE | Refills: 0 | Status: SHIPPED | OUTPATIENT
Start: 2022-10-03

## 2022-10-03 SDOH — ECONOMIC STABILITY: FOOD INSECURITY: WITHIN THE PAST 12 MONTHS, THE FOOD YOU BOUGHT JUST DIDN'T LAST AND YOU DIDN'T HAVE MONEY TO GET MORE.: NEVER TRUE

## 2022-10-03 SDOH — ECONOMIC STABILITY: FOOD INSECURITY: WITHIN THE PAST 12 MONTHS, YOU WORRIED THAT YOUR FOOD WOULD RUN OUT BEFORE YOU GOT MONEY TO BUY MORE.: NEVER TRUE

## 2022-10-03 SDOH — ECONOMIC STABILITY: INCOME INSECURITY: IN THE LAST 12 MONTHS, WAS THERE A TIME WHEN YOU WERE NOT ABLE TO PAY THE MORTGAGE OR RENT ON TIME?: NO

## 2022-10-03 ASSESSMENT — PATIENT HEALTH QUESTIONNAIRE - PHQ9
2. FEELING DOWN, DEPRESSED OR HOPELESS: 0
SUM OF ALL RESPONSES TO PHQ9 QUESTIONS 1 & 2: 0
SUM OF ALL RESPONSES TO PHQ QUESTIONS 1-9: 0
1. LITTLE INTEREST OR PLEASURE IN DOING THINGS: 0

## 2022-10-03 ASSESSMENT — ENCOUNTER SYMPTOMS
TROUBLE SWALLOWING: 0
NAUSEA: 0
VOICE CHANGE: 0
VOMITING: 0
SINUS PRESSURE: 0
SINUS PAIN: 0
CONSTIPATION: 0
DIARRHEA: 0
FACIAL SWELLING: 0
CHEST TIGHTNESS: 0
SHORTNESS OF BREATH: 0
COLOR CHANGE: 0
ABDOMINAL PAIN: 0
WHEEZING: 0
SORE THROAT: 0
RHINORRHEA: 0
COUGH: 0
BACK PAIN: 0

## 2022-10-03 ASSESSMENT — LIFESTYLE VARIABLES
HOW OFTEN DO YOU HAVE A DRINK CONTAINING ALCOHOL: NEVER
HOW MANY STANDARD DRINKS CONTAINING ALCOHOL DO YOU HAVE ON A TYPICAL DAY: PATIENT DOES NOT DRINK

## 2022-10-03 ASSESSMENT — SOCIAL DETERMINANTS OF HEALTH (SDOH): HOW HARD IS IT FOR YOU TO PAY FOR THE VERY BASICS LIKE FOOD, HOUSING, MEDICAL CARE, AND HEATING?: NOT HARD AT ALL

## 2022-10-03 NOTE — PROGRESS NOTES
OFFICE PROGRESS NOTE  [address] [place] [address] Rd  [address] [address]  Dept: [phone]   Chief Complaint   Patient presents with    Gastroesophageal Reflux    Health Maintenance     Declined flu       ASSESSMENT/PLAN   1. Gastroesophageal reflux disease without esophagitis  Assessment & Plan:  Well controlled, discussed starting to wean off the omeprazole and see how she does. Stay well hydrated. -Discussed elevated the HOB 30 degrees  -Discussed limiting spicy, fatty, greasy foods  -Discussed limit caffeine consumption to 1 - 2 cups daily  -Discussed waiting at least 3 - 4 hours before going to bed after eating  -Discussed not to eat and bend over immediately or lift heavy items.  -Discussed weight reduction if needed even 5 - 10 pounds.  -Discussed taking medications 1 hour prior to eating. Orders:  -     CBC with Auto Differential; Future  -     Comprehensive Metabolic Panel; Future  -     omeprazole (PRILOSEC) 20 MG delayed release capsule; Take 1 capsule by mouth Daily Trying to wean off, Disp-90 capsule, R-0Normal  2. Degenerative arthritis of thumb, left  Assessment & Plan:   New problem, discussed using epsom salt soaks twice and can also try theraffin wax treatments. RX voltaren gel 2 grams to the affected area up to 4 times a day. Orders:  -     diclofenac sodium (VOLTAREN) 1 % GEL; Apply 2 g topically 4 times daily, Topical, 4 TIMES DAILY Starting Mon 10/3/2022, Disp-150 g, R-1, Normal  3. FH: hyperlipidemia  -     Lipid Panel; Future  4. FH: diabetes mellitus  -     Hemoglobin A1C; Future  5. Influenza vaccination declined by patient       Discussed exercising 30 minutes daily and Discussed taking medications as directed and adverse effects    Return in about 6 months (around 4/3/2023), or if symptoms worsen or fail to improve, for GERD.      HPI:   GERD has been stable, she bloats on occasion now since she stopped the pop and has been feeling much better. She has continued to take the omeprazole 20 mg daily. States she changed her diet over the last 6 weeks and feels so much better. She says she was drinking a lot of pop. She does have a lot of gas and not as bad when she eats lunch. She had EGD which was normal.     She is complaining of left thumb base pain and does have decreased ROM, no warmth or redness noted. Has been going on for the last year off and on but over the last couple of weeks has been worse. Then it will resolve for a while and come back. She is due for fasting labs with  diabetes her last A1c 5.5%, FH CAD, hyperlipidemia. She feels well today, had hysterectomy with Dr Blaine Parker and has upcoming appt for internal exam.     Current Outpatient Medications:     omeprazole (PRILOSEC) 20 MG delayed release capsule, Take 1 capsule by mouth Daily Trying to wean off, Disp: 90 capsule, Rfl: 0    diclofenac sodium (VOLTAREN) 1 % GEL, Apply 2 g topically 4 times daily, Disp: 150 g, Rfl: 1    valACYclovir (VALTREX) 500 MG tablet, Take 2000 mg Q 12 x 1 day, Disp: 20 tablet, Rfl: 0    valACYclovir (VALTREX) 1 g tablet, TAKE 1 TABLET BY MOUTH EVERY DAY FOR 3 DAYS (Patient not taking: Reported on 10/3/2022), Disp: , Rfl:       Surgical History:  has a past surgical history that includes Tubal ligation (1999); Appendectomy (2009); Hemorrhoid surgery; LEEP (11/2020); Total abdominal hysterectomy w/ bilateral salpingoophorectomy (12/29/2020); Hysterectomy; and Breast reduction surgery (Bilateral, 6/1/2021). Social History:  reports that she has never smoked. She has never used smokeless tobacco. She reports that she does not drink alcohol and does not use drugs. Family History: family history includes Arthritis in her maternal grandmother; Breast Cancer (age of onset: 21) in her sister; COPD in her mother and sister; Cancer in her maternal grandfather and sister; Cancer (age of onset: 27) in her maternal uncle;  Cancer (age of onset: 50) in her sister; Cancer (age of onset: 58) in her maternal aunt; Coronary Art Dis (age of onset: 28) in her brother; Dementia in her maternal grandmother; Diabetes in her sister; Early Death (age of onset: 28) in her brother; Heart Attack in her paternal grandmother; Heart Attack (age of onset: 27) in her father; Heart Disease in her brother and father; High Cholesterol in her sister; Kidney Disease in her maternal grandmother; Mental Illness in her paternal grandfather; No Known Problems in her sister and another family member; Other in her mother; Stroke in her paternal grandmother. I have reviewed Radha's allergies, medications, problem list, medical, social and family history and have updated as needed in the electronic medical record    Review of Systems   Constitutional:  Negative for activity change, appetite change, chills, diaphoresis, fatigue, fever and unexpected weight change. HENT:  Negative for congestion, dental problem, drooling, ear discharge, ear pain, facial swelling, hearing loss, mouth sores, nosebleeds, postnasal drip, rhinorrhea, sinus pressure, sinus pain, sneezing, sore throat, tinnitus, trouble swallowing and voice change. Eyes:  Negative for visual disturbance. Respiratory:  Negative for cough, chest tightness, shortness of breath and wheezing. Cardiovascular:  Negative for chest pain, palpitations and leg swelling. Gastrointestinal:  Negative for abdominal pain, constipation, diarrhea, nausea and vomiting. Bloating off and on. Endocrine: Negative for cold intolerance, heat intolerance, polydipsia, polyphagia and polyuria. Genitourinary:  Negative for difficulty urinating, frequency and urgency. Musculoskeletal:  Negative for arthralgias, back pain, gait problem, joint swelling, myalgias, neck pain and neck stiffness. Skin:  Negative for color change, pallor, rash and wound.    Allergic/Immunologic: Negative for environmental allergies, food allergies and immunocompromised state. Neurological:  Negative for dizziness, tremors, seizures, syncope, facial asymmetry, speech difficulty, weakness, light-headedness, numbness and headaches. Hematological:  Negative for adenopathy. Does not bruise/bleed easily. Psychiatric/Behavioral:  Negative for agitation, behavioral problems, confusion, decreased concentration, dysphoric mood, hallucinations, self-injury, sleep disturbance and suicidal ideas. The patient is not nervous/anxious and is not hyperactive. OBJECTIVE:     VS:  Wt Readings from Last 3 Encounters:   10/03/22 152 lb 9.6 oz (69.2 kg)   03/28/22 157 lb (71.2 kg)   01/11/22 155 lb (70.3 kg)                       Vitals:    10/03/22 0905   BP: 116/78   Pulse: 67   Resp: 16   Temp: 97.6 °F (36.4 °C)   SpO2: 96%   Weight: 152 lb 9.6 oz (69.2 kg)   Height: 5' 1\" (1.549 m)       General: Alert and oriented to person, place, and time, well developed and well nourished, in no acute distress  SKIN: Warm and dry, intact without any rash, masses or lesions  HEAD: normocephalic, atraumatic  Eyes: sclera/conjunctiva clear, PERRLA, EOMI's intact  ENT: tympanic membranes, external ear and ear canal normal bilaterally, normal hearing, Nose without deformity, nasal mucosa and turbinates normal without polyps   Throat: clear, tongue midline, no  drainage, no masses or lesions noted, good dentition  Neck: supple and non-tender without mass, trachea midline, no cervical lymphadenopathy, no bruit, no thyromegaly or nodules  Cardiovascular: regular rate and regular rhythm, normal S1 and S2,  no murmurs, rubs, clicks, or gallop. Distal pulses intact, no carotid bruits. No edema  Pulmonary/Chest: clear to auscultation bilaterally, no wheezes, rales or rhonchi, normal air movement, no respiratory distress  Abdomen: soft, non-tender, non-distended, normal bowel sounds, no masses or hepatosplenomegaly  Musculoskeletal: left thumb base swollen and tender, no redness or warm. Neurologic:  gait, coordination and speech normal  Extremities: no clubbing, cyanosis, or edema. Psychiatric: Good eye contact, normal mood and affect, answers questions appropriately    I have reviewed my findings and recommendations with Radha Salter.     TALI Schmid - CNP, NP-C, FNP-BC

## 2022-10-03 NOTE — ASSESSMENT & PLAN NOTE
New problem, discussed using epsom salt soaks twice and can also try theraffin wax treatments. RX voltaren gel 2 grams to the affected area up to 4 times a day.

## 2022-10-03 NOTE — ASSESSMENT & PLAN NOTE
Well controlled, discussed starting to wean off the omeprazole and see how she does. Stay well hydrated. -Discussed elevated the HOB 30 degrees  -Discussed limiting spicy, fatty, greasy foods  -Discussed limit caffeine consumption to 1 - 2 cups daily  -Discussed waiting at least 3 - 4 hours before going to bed after eating  -Discussed not to eat and bend over immediately or lift heavy items.  -Discussed weight reduction if needed even 5 - 10 pounds.  -Discussed taking medications 1 hour prior to eating.

## 2023-01-02 DIAGNOSIS — K21.9 GASTROESOPHAGEAL REFLUX DISEASE WITHOUT ESOPHAGITIS: ICD-10-CM

## 2023-01-03 RX ORDER — OMEPRAZOLE 20 MG/1
CAPSULE, DELAYED RELEASE ORAL
Qty: 90 CAPSULE | Refills: 0 | OUTPATIENT
Start: 2023-01-03

## 2023-01-05 DIAGNOSIS — K21.9 GASTROESOPHAGEAL REFLUX DISEASE WITHOUT ESOPHAGITIS: ICD-10-CM

## 2023-01-05 RX ORDER — OMEPRAZOLE 20 MG/1
20 CAPSULE, DELAYED RELEASE ORAL DAILY
Qty: 90 CAPSULE | Refills: 0 | Status: SHIPPED | OUTPATIENT
Start: 2023-01-05

## 2023-01-05 NOTE — TELEPHONE ENCOUNTER
I called patient to follow up and informed, per Cande Bueno, patient was supposed to wean off. Patient agreed and stated she was taking it every 2-3 days and then as needed, only when she starts to have reflux or the feeling that she's going to have reflux. Please advise if RX can be refilled.

## 2023-06-21 ENCOUNTER — TELEPHONE (OUTPATIENT)
Dept: BREAST CENTER | Age: 53
End: 2023-06-21

## 2023-06-21 NOTE — TELEPHONE ENCOUNTER
Called and spoke with patient as she is a new referral. Imaging from [facility] recommends right stereotactic breast biopsy. Patient understands need to bring the breast imaging to our center for review from Radiologist. Once complete, we will move forward with recommendations/ biopsy.     Electronically signed by Kayla Martell RN on 6/21/23 at 8:23 AM EDT

## 2023-06-27 ENCOUNTER — TELEPHONE (OUTPATIENT)
Dept: GENERAL RADIOLOGY | Age: 53
End: 2023-06-27

## 2023-07-13 ENCOUNTER — TELEPHONE (OUTPATIENT)
Dept: BREAST CENTER | Age: 53
End: 2023-07-13

## 2023-07-13 DIAGNOSIS — N63.10 MASS OF RIGHT BREAST, UNSPECIFIED QUADRANT: Primary | ICD-10-CM

## 2023-07-13 NOTE — TELEPHONE ENCOUNTER
Called patient to discuss recent film consult on her breast imaging. She states her biopsy has been scheduled for 7/18/23. We will follow up with patient once those results are available.     Electronically signed by Sandip Muse RN on 7/13/23 at 10:22 AM EDT

## 2023-07-18 ENCOUNTER — HOSPITAL ENCOUNTER (OUTPATIENT)
Dept: GENERAL RADIOLOGY | Age: 53
Discharge: HOME | End: 2023-07-20
Payer: COMMERCIAL

## 2023-07-18 DIAGNOSIS — N63.10 MASS OF RIGHT BREAST, UNSPECIFIED QUADRANT: ICD-10-CM

## 2023-07-18 PROCEDURE — 2500000003 HC RX 250 WO HCPCS

## 2023-07-18 PROCEDURE — 19081 BX BREAST 1ST LESION STRTCTC: CPT

## 2023-07-18 NOTE — PROGRESS NOTES
Met with patient prior to her breast biopsy. Instructed on stereotactic  breast biopsy procedure. Denies use of blood thinners or aspirin products within the past 5 days. I remained with her during the biopsy. She tolerated biopsy well. Instructed that results will be available in approximately 3-5 business days. She indicates that she  does not check [employer] account and requests to receive breast biopsy results by phone. Instructed that her physician will also be notified of results. Provided with folder containing my contact information and post biopsy discharge instructions. Instructed to call me if she has any questions or concerns about her biopsy. Verbalizes understanding.   Electronically signed by Merry Fuentes RN, BSN on 7/18/2023 at 11:20 AM

## 2023-07-24 LAB — SURGICAL PATHOLOGY REPORT: NORMAL

## 2023-07-25 ENCOUNTER — TELEPHONE (OUTPATIENT)
Dept: GENERAL RADIOLOGY | Age: 53
End: 2023-07-25

## 2023-07-25 NOTE — TELEPHONE ENCOUNTER
Left voicemail message regarding benign right breast biopsy findings: fat necrosis and calcifications and performing radiologist's recommendation for annual screening mammogram.  Asked Willie Herrmann to call me regarding referral to Dr. Parisa Martin. Breast biopsy results routed to Dr. Deepali Freitas staff.  Electronically signed by Lui De Luna RN, BSN on 7/25/2023 at 9:18 AM

## 2023-07-28 ENCOUNTER — TELEPHONE (OUTPATIENT)
Dept: BREAST CENTER | Age: 53
End: 2023-07-28

## 2023-07-28 NOTE — TELEPHONE ENCOUNTER
RN attempt to contact patient to schedule consult appointment post biopsy. RN reached patient VM and left detailed message of why was calling and office number for patient to call office back and set up appt. Patient needs scheduled for NEW PATIENT-benign right breast biopsy findings: fat necrosis and calcifications-BX [facility], [facility]-ref by Nathalia Albarado name on bx order.          Electronically signed by Rocío Colunga RN on 7/28/23 at 11:20 AM EDT

## 2023-08-15 NOTE — TELEPHONE ENCOUNTER
Patient was referred by Gulshan Poole for abnormal right breast imaging, recommending biopsy. Patient was scheduled on 9/21/2023 in [facility] office @ 8am with Dr. Delmy Alonso. Patient was instructed to bring a photo ID, insurance card (if applicable), and list of any current medications. Patient verbalized understanding of appointment instructions.         Electronically signed by Dominic Francis RN on 8/15/23 at 8:47 AM EDT

## 2023-08-16 ENCOUNTER — TELEPHONE (OUTPATIENT)
Dept: BREAST CENTER | Age: 53
End: 2023-08-16

## 2023-09-21 ENCOUNTER — TELEPHONE (OUTPATIENT)
Dept: BREAST CENTER | Age: 53
End: 2023-09-21

## 2023-09-21 NOTE — TELEPHONE ENCOUNTER
Patient was scheduled for an appointment 9/21/23 in our office she did not show. Left message on patient voice mail - waiting for a return call to reschedule.

## 2024-01-09 ENCOUNTER — OFFICE VISIT (OUTPATIENT)
Dept: FAMILY MEDICINE CLINIC | Age: 54
End: 2024-01-09
Payer: COMMERCIAL

## 2024-01-09 VITALS
BODY MASS INDEX: 30.25 KG/M2 | DIASTOLIC BLOOD PRESSURE: 80 MMHG | SYSTOLIC BLOOD PRESSURE: 132 MMHG | HEIGHT: 61 IN | RESPIRATION RATE: 12 BRPM | TEMPERATURE: 97 F | HEART RATE: 71 BPM | OXYGEN SATURATION: 98 % | WEIGHT: 160.2 LBS

## 2024-01-09 DIAGNOSIS — K59.04 CHRONIC IDIOPATHIC CONSTIPATION: ICD-10-CM

## 2024-01-09 DIAGNOSIS — R14.0 BLOATING: ICD-10-CM

## 2024-01-09 DIAGNOSIS — K21.9 GASTROESOPHAGEAL REFLUX DISEASE WITHOUT ESOPHAGITIS: Primary | ICD-10-CM

## 2024-01-09 PROCEDURE — 99214 OFFICE O/P EST MOD 30 MIN: CPT | Performed by: NURSE PRACTITIONER

## 2024-01-09 SDOH — ECONOMIC STABILITY: INCOME INSECURITY: HOW HARD IS IT FOR YOU TO PAY FOR THE VERY BASICS LIKE FOOD, HOUSING, MEDICAL CARE, AND HEATING?: NOT HARD AT ALL

## 2024-01-09 SDOH — ECONOMIC STABILITY: FOOD INSECURITY: WITHIN THE PAST 12 MONTHS, YOU WORRIED THAT YOUR FOOD WOULD RUN OUT BEFORE YOU GOT MONEY TO BUY MORE.: NEVER TRUE

## 2024-01-09 SDOH — ECONOMIC STABILITY: FOOD INSECURITY: WITHIN THE PAST 12 MONTHS, THE FOOD YOU BOUGHT JUST DIDN'T LAST AND YOU DIDN'T HAVE MONEY TO GET MORE.: NEVER TRUE

## 2024-01-09 ASSESSMENT — ENCOUNTER SYMPTOMS
COUGH: 0
ABDOMINAL PAIN: 1
VOMITING: 0
WHEEZING: 0
BACK PAIN: 0
NAUSEA: 0
CONSTIPATION: 1
SHORTNESS OF BREATH: 0
DIARRHEA: 0

## 2024-01-09 ASSESSMENT — PATIENT HEALTH QUESTIONNAIRE - PHQ9
SUM OF ALL RESPONSES TO PHQ9 QUESTIONS 1 & 2: 0
2. FEELING DOWN, DEPRESSED OR HOPELESS: 0
SUM OF ALL RESPONSES TO PHQ QUESTIONS 1-9: 0
1. LITTLE INTEREST OR PLEASURE IN DOING THINGS: 0
SUM OF ALL RESPONSES TO PHQ QUESTIONS 1-9: 0

## 2024-01-09 NOTE — PROGRESS NOTES
Radha Salter (:  1970) is a 53 y.o. female,New patient, here for evaluation of the following chief complaint(s):  Established New Doctor (Issues with constipation )         ASSESSMENT/PLAN:  1. Gastroesophageal reflux disease without esophagitis  2. Chronic idiopathic constipation  3. Bloating  -  continue omeprazole as needed  -  increase water and fiber intake  -  miralax as needed  -  if bloating continues, remove gluten and diary from diet to see if there is improvement in symptoms      Return if symptoms worsen or fail to improve.         Subjective   SUBJECTIVE/OBJECTIVE:  HPI  Here today to establish. Feeling bloated whenever she eats. Also constipation. She states she used to take a metamucil tab daily and stopped.     /80   Pulse 71   Temp 97 °F (36.1 °C)   Resp 12   Ht 1.549 m (5' 1\")   Wt 72.7 kg (160 lb 3.2 oz)   LMP 2020   SpO2 98%   BMI 30.27 kg/m²     Review of Systems   Constitutional:  Positive for diaphoresis (hot flashes). Negative for activity change, appetite change, chills, fever and unexpected weight change.   Respiratory:  Negative for cough, shortness of breath and wheezing.    Cardiovascular:  Negative for chest pain and palpitations.   Gastrointestinal:  Positive for abdominal pain (bloating) and constipation. Negative for diarrhea, nausea and vomiting.        Acid reflux   Genitourinary:  Negative for difficulty urinating and menstrual problem (partial hysterctomy about 2 years ago).   Musculoskeletal:  Negative for arthralgias, back pain and neck pain.   Neurological:  Negative for dizziness, weakness and headaches.   Psychiatric/Behavioral:  Negative for dysphoric mood and sleep disturbance. The patient is not nervous/anxious.           Objective   Physical Exam  Constitutional:       Appearance: She is well-developed.   HENT:      Head: Normocephalic and atraumatic.   Neck:      Thyroid: No thyromegaly.      Trachea: No tracheal deviation.

## 2024-02-15 ENCOUNTER — OFFICE VISIT (OUTPATIENT)
Dept: FAMILY MEDICINE CLINIC | Age: 54
End: 2024-02-15
Payer: COMMERCIAL

## 2024-02-15 VITALS
WEIGHT: 159.4 LBS | RESPIRATION RATE: 16 BRPM | SYSTOLIC BLOOD PRESSURE: 120 MMHG | TEMPERATURE: 97.9 F | DIASTOLIC BLOOD PRESSURE: 82 MMHG | BODY MASS INDEX: 30.09 KG/M2 | HEIGHT: 61 IN | OXYGEN SATURATION: 98 % | HEART RATE: 76 BPM

## 2024-02-15 DIAGNOSIS — R73.09 ELEVATED GLUCOSE: ICD-10-CM

## 2024-02-15 DIAGNOSIS — K21.9 GASTROESOPHAGEAL REFLUX DISEASE WITHOUT ESOPHAGITIS: ICD-10-CM

## 2024-02-15 DIAGNOSIS — E55.9 VITAMIN D DEFICIENCY: ICD-10-CM

## 2024-02-15 DIAGNOSIS — Z71.89 ACP (ADVANCE CARE PLANNING): ICD-10-CM

## 2024-02-15 DIAGNOSIS — Z00.00 ENCOUNTER FOR WELL ADULT EXAM WITHOUT ABNORMAL FINDINGS: ICD-10-CM

## 2024-02-15 DIAGNOSIS — Z13.220 SCREENING CHOLESTEROL LEVEL: ICD-10-CM

## 2024-02-15 DIAGNOSIS — R53.83 OTHER FATIGUE: ICD-10-CM

## 2024-02-15 DIAGNOSIS — Z00.00 ENCOUNTER FOR WELL ADULT EXAM WITHOUT ABNORMAL FINDINGS: Primary | ICD-10-CM

## 2024-02-15 LAB
ABSOLUTE IMMATURE GRANULOCYTE: <0.03 K/UL (ref 0–0.58)
ALBUMIN SERPL-MCNC: 4.4 G/DL (ref 3.5–5.2)
ALP BLD-CCNC: 76 U/L (ref 35–104)
ALT SERPL-CCNC: 14 U/L (ref 0–32)
ANION GAP SERPL CALCULATED.3IONS-SCNC: 11 MMOL/L (ref 7–16)
AST SERPL-CCNC: 20 U/L (ref 0–31)
BASOPHILS ABSOLUTE: 0.03 K/UL (ref 0–0.2)
BASOPHILS RELATIVE PERCENT: 1 % (ref 0–2)
BILIRUB SERPL-MCNC: 0.5 MG/DL (ref 0–1.2)
BUN BLDV-MCNC: 15 MG/DL (ref 6–20)
CALCIUM SERPL-MCNC: 9.4 MG/DL (ref 8.6–10.2)
CHLORIDE BLD-SCNC: 104 MMOL/L (ref 98–107)
CHOLESTEROL: 184 MG/DL
CO2: 22 MMOL/L (ref 22–29)
CREAT SERPL-MCNC: 0.8 MG/DL (ref 0.5–1)
EOSINOPHILS ABSOLUTE: 0.29 K/UL (ref 0.05–0.5)
EOSINOPHILS RELATIVE PERCENT: 5 % (ref 0–6)
GFR SERPL CREATININE-BSD FRML MDRD: >60 ML/MIN/1.73M2
GLUCOSE BLD-MCNC: 93 MG/DL (ref 74–99)
HBA1C MFR BLD: 5.8 % (ref 4–5.6)
HCT VFR BLD CALC: 42 % (ref 34–48)
HDLC SERPL-MCNC: 53 MG/DL
HEMOGLOBIN: 13.7 G/DL (ref 11.5–15.5)
IMMATURE GRANULOCYTES: 0 % (ref 0–5)
LDL CHOLESTEROL: 118 MG/DL
LYMPHOCYTES ABSOLUTE: 3.05 K/UL (ref 1.5–4)
LYMPHOCYTES RELATIVE PERCENT: 49 % (ref 20–42)
MCH RBC QN AUTO: 30.6 PG (ref 26–35)
MCHC RBC AUTO-ENTMCNC: 32.6 G/DL (ref 32–34.5)
MCV RBC AUTO: 93.8 FL (ref 80–99.9)
MONOCYTES ABSOLUTE: 0.42 K/UL (ref 0.1–0.95)
MONOCYTES RELATIVE PERCENT: 7 % (ref 2–12)
NEUTROPHILS ABSOLUTE: 2.49 K/UL (ref 1.8–7.3)
NEUTROPHILS RELATIVE PERCENT: 40 % (ref 43–80)
PDW BLD-RTO: 12.6 % (ref 11.5–15)
PLATELET # BLD: 295 K/UL (ref 130–450)
PMV BLD AUTO: 11.3 FL (ref 7–12)
POTASSIUM SERPL-SCNC: 5.2 MMOL/L (ref 3.5–5)
RBC # BLD: 4.48 M/UL (ref 3.5–5.5)
SODIUM BLD-SCNC: 137 MMOL/L (ref 132–146)
TOTAL PROTEIN: 7.5 G/DL (ref 6.4–8.3)
TRIGL SERPL-MCNC: 67 MG/DL
TSH SERPL DL<=0.05 MIU/L-ACNC: 1.77 UIU/ML (ref 0.27–4.2)
VITAMIN D 25-HYDROXY: 27.5 NG/ML (ref 30–100)
VLDLC SERPL CALC-MCNC: 13 MG/DL
WBC # BLD: 6.3 K/UL (ref 4.5–11.5)

## 2024-02-15 PROCEDURE — 99396 PREV VISIT EST AGE 40-64: CPT | Performed by: NURSE PRACTITIONER

## 2024-02-15 PROCEDURE — 36415 COLL VENOUS BLD VENIPUNCTURE: CPT | Performed by: NURSE PRACTITIONER

## 2024-02-15 RX ORDER — OMEPRAZOLE 40 MG/1
40 CAPSULE, DELAYED RELEASE ORAL
Qty: 90 CAPSULE | OUTPATIENT
Start: 2024-02-15

## 2024-02-15 RX ORDER — OMEPRAZOLE 40 MG/1
40 CAPSULE, DELAYED RELEASE ORAL
Qty: 30 CAPSULE | Refills: 2 | Status: SHIPPED | OUTPATIENT
Start: 2024-02-15

## 2024-02-15 NOTE — PROGRESS NOTES
Well Adult Note  Name: Radha Salter Today’s Date: 2/15/2024   MRN: [id number] Sex: Female   Age: 53 y.o. Ethnicity:  /    : 1970 Race: White (non-)      Chief Complaint   Patient presents with    Annual Exam       Radha Salter is here for well adult exam.  History:  Here today for px, fasting today.     Has acid reflux sx twice weekly. Hx of h.pylori. Has seen Naffah in the past.     Father had first heart attack at 35 and  from heart disease at 74. His mother  of heart disease. Had a brother who  of heart disease at 40.     Review of Systems   Constitutional:  Positive for diaphoresis (hot flashes) and fatigue. Negative for activity change, appetite change, chills, fever and unexpected weight change.   Eyes:  Negative for visual disturbance.   Respiratory:  Negative for cough, chest tightness, shortness of breath and wheezing.    Cardiovascular:  Negative for chest pain, palpitations and leg swelling.   Gastrointestinal:  Positive for abdominal distention (gas) and constipation (occasionally). Negative for abdominal pain, blood in stool, diarrhea, nausea and vomiting.        Acid reflux   Endocrine: Negative for cold intolerance, heat intolerance, polydipsia and polyuria.   Genitourinary:  Negative for difficulty urinating, frequency, menstrual problem (partial hysterectomy, cervical cancer), urgency, vaginal discharge and vaginal pain.   Musculoskeletal:  Negative for arthralgias, back pain, myalgias and neck pain.   Skin:  Negative for rash.   Allergic/Immunologic: Negative for environmental allergies.   Neurological:  Negative for dizziness, weakness and headaches.   Hematological:  Negative for adenopathy. Does not bruise/bleed easily.   Psychiatric/Behavioral:  Positive for sleep disturbance. Negative for dysphoric mood. The patient is not nervous/anxious.        No Known Allergies      Prior to Visit Medications    Medication Sig Taking? Authorizing Provider

## 2024-02-15 NOTE — PATIENT INSTRUCTIONS
Advance Care Planning     Advance Care Planning opens a door to talk about and write down your wishes before a sudden accident or illness.  Make your goals, values, and preferences known.     This puts you in the ’s seat and helps others know what matters most to you so they won’t have to guess.      Where can you learn more?    Go to [email or web address]   to learn how to:    Name someone you trust to make healthcare decisions for you, only if you can’t. (Healthcare Power of )    Document your wishes for care if you were seriously ill and not expected to recover or are approaching end of life. (Advance Directive or Living Will)    The same page can be found using the QR code below.                Well Visit, Ages 18 to 65: Care Instructions  Well visits can help you stay healthy. Your doctor has checked your overall health and may have suggested ways to take good care of yourself. Your doctor also may have recommended tests. You can help prevent illness with healthy eating, good sleep, vaccinations, regular exercise, and other steps.    Get the tests that you and your doctor decide on. Depending on your age and risks, examples might include screening for diabetes; hepatitis C; HIV; and cervical, breast, lung, and colon cancer. Screening helps find diseases before any symptoms appear.   Eat healthy foods. Choose fruits, vegetables, whole grains, lean protein, and low-fat dairy foods. Limit saturated fat and reduce salt.     Limit alcohol. Men should have no more than 2 drinks a day. Women should have no more than 1. For some people, no alcohol is the best choice.   Exercise. Get at least 30 minutes of exercise on most days of the week. Walking can be a good choice.     Reach and stay at your healthy weight. This will lower your risk for many health problems.   Take care of your mental health. Try to stay connected with friends, family, and community, and find

## 2024-02-29 ENCOUNTER — OFFICE VISIT (OUTPATIENT)
Dept: FAMILY MEDICINE CLINIC | Age: 54
End: 2024-02-29
Payer: COMMERCIAL

## 2024-02-29 VITALS
OXYGEN SATURATION: 98 % | HEART RATE: 71 BPM | BODY MASS INDEX: 30.06 KG/M2 | WEIGHT: 159.2 LBS | SYSTOLIC BLOOD PRESSURE: 128 MMHG | RESPIRATION RATE: 16 BRPM | HEIGHT: 61 IN | TEMPERATURE: 97.5 F | DIASTOLIC BLOOD PRESSURE: 88 MMHG

## 2024-02-29 DIAGNOSIS — E55.9 VITAMIN D DEFICIENCY: ICD-10-CM

## 2024-02-29 DIAGNOSIS — R73.03 PREDIABETES: ICD-10-CM

## 2024-02-29 DIAGNOSIS — E78.41 ELEVATED LIPOPROTEIN(A): ICD-10-CM

## 2024-02-29 DIAGNOSIS — Z82.49 FAMILY HISTORY OF EARLY CAD: Primary | ICD-10-CM

## 2024-02-29 DIAGNOSIS — Z83.3 FH: DIABETES MELLITUS: ICD-10-CM

## 2024-02-29 PROCEDURE — 99214 OFFICE O/P EST MOD 30 MIN: CPT | Performed by: NURSE PRACTITIONER

## 2024-02-29 NOTE — PROGRESS NOTES
Rdaha Salter (:  1970) is a 53 y.o. female,Established patient, here for evaluation of the following chief complaint(s):  Discuss Labs (Discuss Lab results.)         ASSESSMENT/PLAN:  1. Family history of early CAD  2. FH: diabetes mellitus  3. Elevated lipoprotein(a)  4. Prediabetes  5. Vitamin D deficiency  Father 1st MI at 35, grandmother  at 50 MI, bother 1st MI w/ CABG at 30,  of MI at 40  -  reviewed labs  -  discussed lifestyle changes, increasing exercise, weight loss, healthy eating  -  add OTC Vit D  -  low carb diet  -  will continue to monitor lipids and A1c  The 10-year ASCVD risk score (Radha WOLF, et al., 2019) is: 1.3%    Values used to calculate the score:      Age: 53 years      Sex: Female      Is Non- : No      Diabetic: No      Tobacco smoker: No      Systolic Blood Pressure: 128 mmHg      Is BP treated: No      HDL Cholesterol: 53 mg/dL      Total Cholesterol: 184 mg/dL      Return if symptoms worsen or fail to improve, for annual physical, lab draw.         Subjective   SUBJECTIVE/OBJECTIVE:  HPI  Here today to review labs.     /88   Pulse 71   Temp 97.5 °F (36.4 °C)   Resp 16   Ht 1.549 m (5' 0.98\")   Wt 72.2 kg (159 lb 3.2 oz)   LMP 2020   SpO2 98%   BMI 30.10 kg/m²     Review of Systems       Objective   Physical Exam  Constitutional:       Appearance: Normal appearance.   HENT:      Head: Normocephalic and atraumatic.   Pulmonary:      Effort: Pulmonary effort is normal.   Musculoskeletal:      Cervical back: Normal range of motion.   Neurological:      Mental Status: She is alert and oriented to person, place, and time.   Psychiatric:         Behavior: Behavior normal.            [facility]      An electronic signature was used to authenticate this note.    --Sonja Mcarthur, TALI - CNP

## 2024-04-12 DIAGNOSIS — K21.9 GASTROESOPHAGEAL REFLUX DISEASE WITHOUT ESOPHAGITIS: ICD-10-CM

## 2024-04-12 RX ORDER — OMEPRAZOLE 40 MG/1
40 CAPSULE, DELAYED RELEASE ORAL
Qty: 30 CAPSULE | Refills: 2 | Status: SHIPPED | OUTPATIENT
Start: 2024-04-12

## 2024-06-25 DIAGNOSIS — K21.9 GASTROESOPHAGEAL REFLUX DISEASE WITHOUT ESOPHAGITIS: ICD-10-CM

## 2024-06-25 RX ORDER — OMEPRAZOLE 40 MG/1
40 CAPSULE, DELAYED RELEASE ORAL
Qty: 30 CAPSULE | Refills: 2 | Status: SHIPPED | OUTPATIENT
Start: 2024-06-25

## 2024-12-14 DIAGNOSIS — K21.9 GASTROESOPHAGEAL REFLUX DISEASE WITHOUT ESOPHAGITIS: ICD-10-CM

## 2024-12-16 RX ORDER — OMEPRAZOLE 40 MG/1
40 CAPSULE, DELAYED RELEASE ORAL
Qty: 30 CAPSULE | Refills: 2 | Status: SHIPPED | OUTPATIENT
Start: 2024-12-16

## 2024-12-16 NOTE — TELEPHONE ENCOUNTER
Name of Medication(s) Requested:  Requested Prescriptions     Pending Prescriptions Disp Refills    omeprazole (PRILOSEC) 40 MG delayed release capsule [Pharmacy Med Name: OMEPRAZOLE 40MG CAPSULES] 30 capsule 2     Sig: TAKE 1 CAPSULE BY MOUTH EVERY MORNING BEFORE BREAKFAST       Medication is on current medication list Yes    Dosage and directions were verified? Yes    Quantity verified: 30 day supply     Pharmacy Verified?  Yes    Last Appointment:  2/29/2024    Future appts:  Future Appointments   Date Time Provider Department Center   2/20/2025  8:00 AM Sonja Mcarthur APRN - CNP [facility]        (If no appt send self scheduling link. .REFILLAPPT)  Scheduling request sent?     [] Yes  [x] No    Does patient need updated?  [] Yes  [x] No

## 2025-01-16 ENCOUNTER — HOSPITAL ENCOUNTER (OUTPATIENT)
Dept: GENERAL RADIOLOGY | Age: 55
Discharge: HOME | End: 2025-01-18
Payer: COMMERCIAL

## 2025-01-16 DIAGNOSIS — Z12.31 ENCOUNTER FOR SCREENING MAMMOGRAM FOR MALIGNANT NEOPLASM OF BREAST: ICD-10-CM

## 2025-01-16 PROCEDURE — 77063 BREAST TOMOSYNTHESIS BI: CPT

## 2025-02-20 ENCOUNTER — OFFICE VISIT (OUTPATIENT)
Dept: FAMILY MEDICINE CLINIC | Age: 55
End: 2025-02-20
Payer: COMMERCIAL

## 2025-02-20 VITALS
WEIGHT: 146.6 LBS | SYSTOLIC BLOOD PRESSURE: 126 MMHG | BODY MASS INDEX: 27.68 KG/M2 | RESPIRATION RATE: 16 BRPM | TEMPERATURE: 97.5 F | DIASTOLIC BLOOD PRESSURE: 84 MMHG | OXYGEN SATURATION: 99 % | HEART RATE: 76 BPM | HEIGHT: 61 IN

## 2025-02-20 DIAGNOSIS — Z82.49 FAMILY HISTORY OF EARLY CAD: ICD-10-CM

## 2025-02-20 DIAGNOSIS — Z00.00 ENCOUNTER FOR WELL ADULT EXAM WITHOUT ABNORMAL FINDINGS: Primary | ICD-10-CM

## 2025-02-20 DIAGNOSIS — E78.2 MIXED HYPERLIPIDEMIA: ICD-10-CM

## 2025-02-20 DIAGNOSIS — Z23 IMMUNIZATION DUE: ICD-10-CM

## 2025-02-20 DIAGNOSIS — K21.9 GASTROESOPHAGEAL REFLUX DISEASE WITHOUT ESOPHAGITIS: ICD-10-CM

## 2025-02-20 PROCEDURE — 90715 TDAP VACCINE 7 YRS/> IM: CPT | Performed by: NURSE PRACTITIONER

## 2025-02-20 PROCEDURE — 99396 PREV VISIT EST AGE 40-64: CPT | Performed by: NURSE PRACTITIONER

## 2025-02-20 PROCEDURE — 90471 IMMUNIZATION ADMIN: CPT | Performed by: NURSE PRACTITIONER

## 2025-02-20 RX ORDER — OMEPRAZOLE 40 MG/1
40 CAPSULE, DELAYED RELEASE ORAL
Qty: 90 CAPSULE | Refills: 1 | Status: SHIPPED | OUTPATIENT
Start: 2025-02-20

## 2025-02-20 SDOH — ECONOMIC STABILITY: FOOD INSECURITY: WITHIN THE PAST 12 MONTHS, YOU WORRIED THAT YOUR FOOD WOULD RUN OUT BEFORE YOU GOT MONEY TO BUY MORE.: NEVER TRUE

## 2025-02-20 SDOH — ECONOMIC STABILITY: FOOD INSECURITY: WITHIN THE PAST 12 MONTHS, THE FOOD YOU BOUGHT JUST DIDN'T LAST AND YOU DIDN'T HAVE MONEY TO GET MORE.: NEVER TRUE

## 2025-02-20 ASSESSMENT — ENCOUNTER SYMPTOMS
BLOOD IN STOOL: 0
NAUSEA: 0
DIARRHEA: 0
VOMITING: 0
ABDOMINAL PAIN: 0
CONSTIPATION: 0
BACK PAIN: 0
SHORTNESS OF BREATH: 0
WHEEZING: 0
COUGH: 0
CHEST TIGHTNESS: 0

## 2025-02-20 ASSESSMENT — PATIENT HEALTH QUESTIONNAIRE - PHQ9
SUM OF ALL RESPONSES TO PHQ QUESTIONS 1-9: 0
2. FEELING DOWN, DEPRESSED OR HOPELESS: NOT AT ALL
1. LITTLE INTEREST OR PLEASURE IN DOING THINGS: NOT AT ALL
SUM OF ALL RESPONSES TO PHQ QUESTIONS 1-9: 0
SUM OF ALL RESPONSES TO PHQ9 QUESTIONS 1 & 2: 0
SUM OF ALL RESPONSES TO PHQ QUESTIONS 1-9: 0
SUM OF ALL RESPONSES TO PHQ QUESTIONS 1-9: 0

## 2025-02-20 NOTE — PROGRESS NOTES
RIGHT Right 2023    IKER STEROTACTIC LOC BREAST BIOPSY RIGHT 2023 [facility]    CARLI AND BSO (CERVIX REMOVED)  2020    Laparoscopic; Dr. Kolby Ovalle [facility]    TUBAL LIGATION       Family History   Problem Relation Age of Onset    COPD Mother     Other Mother         diverticulitis    Heart Disease Father     Heart Attack Father 30         at 81 with MI    High Cholesterol Sister     Cancer Sister         breast    Early Death Brother 32        aneurysm brain    Arthritis Maternal Grandmother         RA    Kidney Disease Maternal Grandmother     Dementia Maternal Grandmother     Cancer Maternal Grandfather         lung cancer    Stroke Paternal Grandmother     Heart Attack Paternal Grandmother     Mental Illness Paternal Grandfather     No Known Problems Other     Diabetes Sister     COPD Sister     No Known Problems Sister     Breast Cancer Sister 20    Cancer Sister 48        thyroid cancer    Cancer Maternal Aunt 62        lymphoma    Cancer Maternal Uncle 30        gastric cancer    Heart Disease Brother     Coronary Art Dis Brother 35        CABG     Social History     Tobacco Use    Smoking status: Never    Smokeless tobacco: Never   Vaping Use    Vaping status: Never Used   Substance Use Topics    Alcohol use: No    Drug use: No        Objective    Vital Signs  /84   Pulse 76   Temp 97.5 °F (36.4 °C)   Resp 16   Ht 1.549 m (5' 0.98\")   Wt 66.5 kg (146 lb 9.6 oz)   LMP 2020   SpO2 99%   BMI 27.71 kg/m²     Wt Readings from Last 3 Encounters:   25 66.5 kg (146 lb 9.6 oz)   24 72.2 kg (159 lb 3.2 oz)   02/15/24 72.3 kg (159 lb 6.4 oz)       Physical Exam    Physical Exam  Constitutional:       Appearance: She is well-developed.   HENT:      Head: Normocephalic and atraumatic.      Right Ear: Tympanic membrane normal.      Left Ear: Tympanic membrane normal.      Nose: Nose normal.      Mouth/Throat:      Mouth: Mucous membranes are moist.   Eyes:

## 2025-08-21 ENCOUNTER — OFFICE VISIT (OUTPATIENT)
Dept: FAMILY MEDICINE CLINIC | Age: 55
End: 2025-08-21

## 2025-08-21 VITALS
BODY MASS INDEX: 27.72 KG/M2 | TEMPERATURE: 97.3 F | DIASTOLIC BLOOD PRESSURE: 72 MMHG | HEIGHT: 61 IN | SYSTOLIC BLOOD PRESSURE: 120 MMHG | OXYGEN SATURATION: 99 % | RESPIRATION RATE: 16 BRPM | HEART RATE: 74 BPM | WEIGHT: 146.8 LBS

## 2025-08-21 DIAGNOSIS — R53.83 OTHER FATIGUE: ICD-10-CM

## 2025-08-21 DIAGNOSIS — E66.3 OVERWEIGHT WITH BODY MASS INDEX (BMI) OF 27 TO 27.9 IN ADULT: ICD-10-CM

## 2025-08-21 DIAGNOSIS — E78.41 ELEVATED LIPOPROTEIN(A): ICD-10-CM

## 2025-08-21 DIAGNOSIS — R73.03 PREDIABETES: ICD-10-CM

## 2025-08-21 DIAGNOSIS — Z82.49 FAMILY HISTORY OF EARLY CAD: Primary | ICD-10-CM

## 2025-08-21 DIAGNOSIS — E55.9 VITAMIN D DEFICIENCY: ICD-10-CM

## 2025-08-21 DIAGNOSIS — R73.09 ELEVATED GLUCOSE: ICD-10-CM

## 2025-08-21 DIAGNOSIS — K21.9 GASTROESOPHAGEAL REFLUX DISEASE WITHOUT ESOPHAGITIS: ICD-10-CM

## 2025-08-21 LAB
ALBUMIN: 4.2 G/DL (ref 3.5–5.2)
ALP BLD-CCNC: 103 U/L (ref 35–104)
ALT SERPL-CCNC: 36 U/L (ref 0–35)
ANION GAP SERPL CALCULATED.3IONS-SCNC: 11 MMOL/L (ref 7–16)
AST SERPL-CCNC: 28 U/L (ref 0–35)
BASOPHILS ABSOLUTE: 0.07 K/UL (ref 0–0.2)
BASOPHILS RELATIVE PERCENT: 1 % (ref 0–2)
BILIRUB SERPL-MCNC: 0.3 MG/DL (ref 0–1.2)
BUN BLDV-MCNC: 25 MG/DL (ref 6–20)
CALCIUM SERPL-MCNC: 10 MG/DL (ref 8.6–10)
CHLORIDE BLD-SCNC: 103 MMOL/L (ref 98–107)
CHOLESTEROL, TOTAL: 199 MG/DL
CO2: 24 MMOL/L (ref 22–29)
CREAT SERPL-MCNC: 0.8 MG/DL (ref 0.5–1)
EOSINOPHILS ABSOLUTE: 0.38 K/UL (ref 0.05–0.5)
EOSINOPHILS RELATIVE PERCENT: 4 % (ref 0–6)
GFR, ESTIMATED: 87 ML/MIN/1.73M2
GLUCOSE BLD-MCNC: 102 MG/DL (ref 74–99)
HBA1C MFR BLD: 5.6 % (ref 4–5.6)
HCT VFR BLD CALC: 41.1 % (ref 34–48)
HDLC SERPL-MCNC: 60 MG/DL
HEMOGLOBIN: 13.2 G/DL (ref 11.5–15.5)
IMMATURE GRANULOCYTES %: 1 % (ref 0–5)
IMMATURE GRANULOCYTES ABSOLUTE: 0.05 K/UL (ref 0–0.58)
LDL CHOLESTEROL: 119 MG/DL
LYMPHOCYTES ABSOLUTE: 3.04 K/UL (ref 1.5–4)
LYMPHOCYTES RELATIVE PERCENT: 33 % (ref 20–42)
MCH RBC QN AUTO: 30.8 PG (ref 26–35)
MCHC RBC AUTO-ENTMCNC: 32.1 G/DL (ref 32–34.5)
MCV RBC AUTO: 96 FL (ref 80–99.9)
MONOCYTES ABSOLUTE: 0.58 K/UL (ref 0.1–0.95)
MONOCYTES RELATIVE PERCENT: 6 % (ref 2–12)
NEUTROPHILS ABSOLUTE: 4.99 K/UL (ref 1.8–7.3)
NEUTROPHILS RELATIVE PERCENT: 55 % (ref 43–80)
PDW BLD-RTO: 12.9 % (ref 11.5–15)
PLATELET # BLD: 406 K/UL (ref 130–450)
PMV BLD AUTO: 10.5 FL (ref 7–12)
POTASSIUM SERPL-SCNC: 4.5 MMOL/L (ref 3.5–5.1)
RBC # BLD: 4.28 M/UL (ref 3.5–5.5)
SODIUM BLD-SCNC: 138 MMOL/L (ref 136–145)
TOTAL PROTEIN: 7.7 G/DL (ref 6.4–8.3)
TRIGL SERPL-MCNC: 101 MG/DL
TSH SERPL DL<=0.05 MIU/L-ACNC: 1.63 UIU/ML (ref 0.27–4.2)
VITAMIN D 25-HYDROXY: 64.5 NG/ML (ref 30–100)
VLDLC SERPL CALC-MCNC: 20 MG/DL
WBC # BLD: 9.1 K/UL (ref 4.5–11.5)

## 2025-08-21 RX ORDER — OMEPRAZOLE 40 MG/1
40 CAPSULE, DELAYED RELEASE ORAL
Qty: 90 CAPSULE | Refills: 1 | Status: SHIPPED | OUTPATIENT
Start: 2025-08-21

## 2025-08-21 ASSESSMENT — ENCOUNTER SYMPTOMS
SHORTNESS OF BREATH: 0
BACK PAIN: 0
VOMITING: 0
CONSTIPATION: 1
NAUSEA: 0
DIARRHEA: 1
COUGH: 0
WHEEZING: 0

## 2025-08-21 ASSESSMENT — PATIENT HEALTH QUESTIONNAIRE - PHQ9
SUM OF ALL RESPONSES TO PHQ QUESTIONS 1-9: 0
SUM OF ALL RESPONSES TO PHQ QUESTIONS 1-9: 0
2. FEELING DOWN, DEPRESSED OR HOPELESS: NOT AT ALL
1. LITTLE INTEREST OR PLEASURE IN DOING THINGS: NOT AT ALL
SUM OF ALL RESPONSES TO PHQ QUESTIONS 1-9: 0
SUM OF ALL RESPONSES TO PHQ QUESTIONS 1-9: 0

## 2025-08-22 ENCOUNTER — RESULTS FOLLOW-UP (OUTPATIENT)
Dept: FAMILY MEDICINE CLINIC | Age: 55
End: 2025-08-22

## 2025-08-22 PROBLEM — E66.3 OVERWEIGHT WITH BODY MASS INDEX (BMI) OF 27 TO 27.9 IN ADULT: Status: ACTIVE | Noted: 2025-08-22

## (undated) DEVICE — CLOTH SURG PREP PREOPERATIVE CHLORHEXIDINE GLUC 2% READYPREP

## (undated) DEVICE — INTENDED FOR TISSUE SEPARATION, AND OTHER PROCEDURES THAT REQUIRE A SHARP SURGICAL BLADE TO PUNCTURE OR CUT.: Brand: BARD-PARKER ® STAINLESS STEEL BLADES

## (undated) DEVICE — APPLICATOR MEDICATED 26 CC SOLUTION HI LT ORNG CHLORAPREP

## (undated) DEVICE — STAPLER SKIN L39MM DIA0.53MM CRWN 5.7MM S STL FIX HD PROX

## (undated) DEVICE — NEEDLE HYPO 27GA L1.25IN GRY POLYPR HUB S STL REG BVL STR

## (undated) DEVICE — SOLUTION IV 1000ML LAC RINGERS PH 6.5 INJ USP VIAFLX PLAS

## (undated) DEVICE — TRAY PROCED PLASTIC CUSTOME SOFT TISS

## (undated) DEVICE — 3M™ STERI-STRIP™ REINFORCED ADHESIVE SKIN CLOSURES, R1548, 1 IN X 5 IN (25 MM X 125 MM), 4 STRIPS/ENVELOPE: Brand: 3M™ STERI-STRIP™

## (undated) DEVICE — BRA SURGICALXL BGE MARENA FR SNAP CMFRT WR

## (undated) DEVICE — PATIENT RETURN ELECTRODE, SINGLE-USE, CONTACT QUALITY MONITORING, ADULT, WITH 9FT CORD, FOR PATIENTS WEIGING OVER 33LBS. (15KG): Brand: MEGADYNE

## (undated) DEVICE — SYRINGE MED 10ML TRNSLUC BRL PLUNG BLK MRK POLYPR CTRL

## (undated) DEVICE — TUBING SUCTION 15FT 3MM 32CM SILICONE

## (undated) DEVICE — SPONGE LAP W18XL18IN WHT COT 4 PLY FLD STRUNG RADPQ DISP ST

## (undated) DEVICE — DRIP REDUCTION MANIFOLD

## (undated) DEVICE — DRAPE THER FLUID WARMING 66X44 IN FLAT SLUSH DBL DISC ORS

## (undated) DEVICE — Device

## (undated) DEVICE — STAPLER SKIN SQ 30 ABSRB STPL DISP INSORB

## (undated) DEVICE — PACK,UNIVERSAL,NO GOWNS: Brand: MEDLINE

## (undated) DEVICE — AGENT HEMSTAT 5GM ARISTA AH

## (undated) DEVICE — BANDAGE,GAUZE,4.5"X4.1YD,STERILE,LF: Brand: MEDLINE

## (undated) DEVICE — TUBING, SUCTION, 3/16" X 12', STRAIGHT: Brand: MEDLINE

## (undated) DEVICE — TOWEL,OR,DSP,ST,BLUE,DLX,10/PK,8PK/CS: Brand: MEDLINE

## (undated) DEVICE — ADHESIVE SKIN CLSR 0.7ML TOP DERMBND ADV

## (undated) DEVICE — DRAPE,REIN 53X77,STERILE: Brand: MEDLINE

## (undated) DEVICE — BASIN EMESIS

## (undated) DEVICE — SURGICAL PROCEDURE PACK BASIC

## (undated) DEVICE — TOWEL,OR,DSP,ST,BLUE,STD,6/PK,12PK/CS: Brand: MEDLINE

## (undated) DEVICE — STERILE POLYISOPRENE POWDER-FREE SURGICAL GLOVES: Brand: PROTEXIS

## (undated) DEVICE — SYRINGE IRRIG 60ML SFT PLIABLE BLB EZ TO GRP 1 HND USE W/

## (undated) DEVICE — PLASMABLADE PS210-030S 3.0S LOCK: Brand: PLASMABLADE™